# Patient Record
Sex: MALE | Race: WHITE | NOT HISPANIC OR LATINO | Employment: OTHER | URBAN - METROPOLITAN AREA
[De-identification: names, ages, dates, MRNs, and addresses within clinical notes are randomized per-mention and may not be internally consistent; named-entity substitution may affect disease eponyms.]

---

## 2020-05-12 PROBLEM — G47.33 MODERATE OBSTRUCTIVE SLEEP APNEA: Status: ACTIVE | Noted: 2020-05-12

## 2020-05-12 PROBLEM — R91.1 PULMONARY NODULE: Status: ACTIVE | Noted: 2020-05-12

## 2022-01-31 ENCOUNTER — OFFICE VISIT (OUTPATIENT)
Dept: FAMILY MEDICINE | Facility: CLINIC | Age: 67
End: 2022-01-31
Payer: COMMERCIAL

## 2022-01-31 VITALS
SYSTOLIC BLOOD PRESSURE: 138 MMHG | BODY MASS INDEX: 36.8 KG/M2 | WEIGHT: 242.81 LBS | OXYGEN SATURATION: 98 % | HEIGHT: 68 IN | TEMPERATURE: 97 F | HEART RATE: 76 BPM | DIASTOLIC BLOOD PRESSURE: 88 MMHG

## 2022-01-31 DIAGNOSIS — Z13.220 ENCOUNTER FOR LIPID SCREENING FOR CARDIOVASCULAR DISEASE: ICD-10-CM

## 2022-01-31 DIAGNOSIS — Z13.6 ENCOUNTER FOR LIPID SCREENING FOR CARDIOVASCULAR DISEASE: ICD-10-CM

## 2022-01-31 DIAGNOSIS — Z12.5 ENCOUNTER FOR PROSTATE CANCER SCREENING: ICD-10-CM

## 2022-01-31 DIAGNOSIS — E66.2 CLASS 2 OBESITY WITH ALVEOLAR HYPOVENTILATION, SERIOUS COMORBIDITY, AND BODY MASS INDEX (BMI) OF 36.0 TO 36.9 IN ADULT: ICD-10-CM

## 2022-01-31 DIAGNOSIS — Z79.899 ENCOUNTER FOR LONG-TERM (CURRENT) USE OF MEDICATIONS: ICD-10-CM

## 2022-01-31 DIAGNOSIS — Z00.00 ENCOUNTER FOR MEDICAL EXAMINATION TO ESTABLISH CARE: Primary | ICD-10-CM

## 2022-01-31 DIAGNOSIS — I10 HYPERTENSION, ESSENTIAL: ICD-10-CM

## 2022-01-31 DIAGNOSIS — Z13.6 SCREENING FOR CARDIOVASCULAR CONDITION: ICD-10-CM

## 2022-01-31 PROBLEM — H91.93 HEARING LOSS, BILATERAL: Status: ACTIVE | Noted: 2017-01-19

## 2022-01-31 PROBLEM — E78.00 HYPERCHOLESTEROLEMIA: Status: ACTIVE | Noted: 2022-01-31

## 2022-01-31 PROBLEM — H61.23 BILATERAL IMPACTED CERUMEN: Status: ACTIVE | Noted: 2017-01-19

## 2022-01-31 PROBLEM — E66.812 CLASS 2 OBESITY WITH ALVEOLAR HYPOVENTILATION, SERIOUS COMORBIDITY, AND BODY MASS INDEX (BMI) OF 36.0 TO 36.9 IN ADULT: Status: ACTIVE | Noted: 2022-01-31

## 2022-01-31 PROBLEM — K11.5 SALIVARY GLAND STONE: Status: ACTIVE | Noted: 2017-01-19

## 2022-01-31 PROCEDURE — 93005 ELECTROCARDIOGRAM TRACING: CPT | Mod: S$GLB,,, | Performed by: FAMILY MEDICINE

## 2022-01-31 PROCEDURE — 3288F PR FALLS RISK ASSESSMENT DOCUMENTED: ICD-10-PCS | Mod: CPTII,S$GLB,, | Performed by: FAMILY MEDICINE

## 2022-01-31 PROCEDURE — 1101F PT FALLS ASSESS-DOCD LE1/YR: CPT | Mod: CPTII,S$GLB,, | Performed by: FAMILY MEDICINE

## 2022-01-31 PROCEDURE — 3008F BODY MASS INDEX DOCD: CPT | Mod: CPTII,S$GLB,, | Performed by: FAMILY MEDICINE

## 2022-01-31 PROCEDURE — 1126F AMNT PAIN NOTED NONE PRSNT: CPT | Mod: CPTII,S$GLB,, | Performed by: FAMILY MEDICINE

## 2022-01-31 PROCEDURE — 99387 PR PREVENTIVE VISIT,NEW,65 & OVER: ICD-10-PCS | Mod: S$GLB,,, | Performed by: FAMILY MEDICINE

## 2022-01-31 PROCEDURE — 1101F PR PT FALLS ASSESS DOC 0-1 FALLS W/OUT INJ PAST YR: ICD-10-PCS | Mod: CPTII,S$GLB,, | Performed by: FAMILY MEDICINE

## 2022-01-31 PROCEDURE — 3079F DIAST BP 80-89 MM HG: CPT | Mod: CPTII,S$GLB,, | Performed by: FAMILY MEDICINE

## 2022-01-31 PROCEDURE — 99387 INIT PM E/M NEW PAT 65+ YRS: CPT | Mod: S$GLB,,, | Performed by: FAMILY MEDICINE

## 2022-01-31 PROCEDURE — 93010 EKG 12-LEAD: ICD-10-PCS | Mod: S$GLB,,, | Performed by: INTERNAL MEDICINE

## 2022-01-31 PROCEDURE — 93010 ELECTROCARDIOGRAM REPORT: CPT | Mod: S$GLB,,, | Performed by: INTERNAL MEDICINE

## 2022-01-31 PROCEDURE — 1159F PR MEDICATION LIST DOCUMENTED IN MEDICAL RECORD: ICD-10-PCS | Mod: CPTII,S$GLB,, | Performed by: FAMILY MEDICINE

## 2022-01-31 PROCEDURE — 1159F MED LIST DOCD IN RCRD: CPT | Mod: CPTII,S$GLB,, | Performed by: FAMILY MEDICINE

## 2022-01-31 PROCEDURE — 3075F PR MOST RECENT SYSTOLIC BLOOD PRESS GE 130-139MM HG: ICD-10-PCS | Mod: CPTII,S$GLB,, | Performed by: FAMILY MEDICINE

## 2022-01-31 PROCEDURE — 3008F PR BODY MASS INDEX (BMI) DOCUMENTED: ICD-10-PCS | Mod: CPTII,S$GLB,, | Performed by: FAMILY MEDICINE

## 2022-01-31 PROCEDURE — 1126F PR PAIN SEVERITY QUANTIFIED, NO PAIN PRESENT: ICD-10-PCS | Mod: CPTII,S$GLB,, | Performed by: FAMILY MEDICINE

## 2022-01-31 PROCEDURE — 3288F FALL RISK ASSESSMENT DOCD: CPT | Mod: CPTII,S$GLB,, | Performed by: FAMILY MEDICINE

## 2022-01-31 PROCEDURE — 3075F SYST BP GE 130 - 139MM HG: CPT | Mod: CPTII,S$GLB,, | Performed by: FAMILY MEDICINE

## 2022-01-31 PROCEDURE — 93005 EKG 12-LEAD: ICD-10-PCS | Mod: S$GLB,,, | Performed by: FAMILY MEDICINE

## 2022-01-31 PROCEDURE — 3079F PR MOST RECENT DIASTOLIC BLOOD PRESSURE 80-89 MM HG: ICD-10-PCS | Mod: CPTII,S$GLB,, | Performed by: FAMILY MEDICINE

## 2022-01-31 PROCEDURE — 99999 PR PBB SHADOW E&M-NEW PATIENT-LVL IV: CPT | Mod: PBBFAC,,, | Performed by: FAMILY MEDICINE

## 2022-01-31 PROCEDURE — 99999 PR PBB SHADOW E&M-NEW PATIENT-LVL IV: ICD-10-PCS | Mod: PBBFAC,,, | Performed by: FAMILY MEDICINE

## 2022-01-31 PROCEDURE — 1160F PR REVIEW ALL MEDS BY PRESCRIBER/CLIN PHARMACIST DOCUMENTED: ICD-10-PCS | Mod: CPTII,S$GLB,, | Performed by: FAMILY MEDICINE

## 2022-01-31 PROCEDURE — 1160F RVW MEDS BY RX/DR IN RCRD: CPT | Mod: CPTII,S$GLB,, | Performed by: FAMILY MEDICINE

## 2022-01-31 RX ORDER — ESOMEPRAZOLE MAGNESIUM 40 MG/1
CAPSULE, DELAYED RELEASE ORAL
COMMUNITY
Start: 2021-07-26 | End: 2022-01-31

## 2022-01-31 RX ORDER — AMLODIPINE BESYLATE 5 MG/1
TABLET ORAL
COMMUNITY
End: 2022-12-12 | Stop reason: SDUPTHER

## 2022-01-31 NOTE — PATIENT INSTRUCTIONS
"Follow up in about 1 year (around 1/31/2023), or if symptoms worsen or fail to improve, for Annual Wellness Exam.     Patient Education       Prostate Cancer Screening (PSA Tests)   The Basics   Written by the doctors and editors at [facility]   What is prostate cancer screening? -- Prostate cancer screening is a way in which doctors check the prostate gland for signs of cancer. The prostate gland is part of the male anatomy. It sits below the bladder and in front of the rectum. It forms a ring around the urethra, the tube that carries urine out of the body (figure 1).  The main test used to screen for prostate cancer is a blood test called a "PSA test." Some people also have an exam called a rectal exam (figure 2).   Who should be screened for prostate cancer? -- Prostate cancer screening is done in people who have no symptoms of the disease. It is not clear whether getting screened for prostate cancer can extend life or prevent symptoms or problems. For this reason, doctors do not know who - if anyone - should be screened for prostate cancer.  Most experts recommend working with your doctor to decide whether screening is right for you. In most cases, this discussion should start around the age of 50. If you have any risk factors for prostate cancer, you might want to begin screening at age 40 to 45. Your risk is higher if you are black, have a father or brother with prostate cancer, or have certain genetic mutations such as "BRCA1" or "BRCA2."  Most doctors recommend against screening if you are age 70 or older, or have serious health problems.  Why do doctors offer screening? -- Doctors offer screening in the hopes of catching prostate cancer early - before it has a chance to grow, spread, or cause symptoms. With many cancers, catching the disease early is an important part of effective treatment. But prostate cancer is not like many other cancers. It usually grows slowly and does not usually lead to death. The " "problem is that a small number of prostate cancers are serious and can lead to death. Doctors do not have an ideal way to tell which prostate cancers are deadly and which ones would never cause any problems.  Certain tests can suggest which prostate cancers might be more likely to cause problems. But the tests are far from perfect. Also, different studies have come to different conclusions about the benefits of screening and whether or not it lowers the risk of dying from prostate cancer.  What are the drawbacks to getting screened? -- PSA tests have 2 main drawbacks:  · PSA tests sometimes give "false positives." This means they suggest cancer when there is actually no cancer. This can lead to unneeded worry and to further tests. One of these tests, a biopsy, can be briefly painful.  · When PSA tests lead to the discovery of cancer, there is very often no way to tell whether the cancer is one that could do harm. That means you could get treated for cancer that would never have done you any harm. That's a problem because treatment for prostate cancer has risks and often causes problems of its own. For instance, prostate cancer treatment can cause you to leak urine and to have problems with sex.  How do I decide if I should be screened? -- Work with your doctor or nurse to decide if screening is right for you. This will involve thinking about how likely it is that you will get prostate cancer. If you have a high risk of prostate cancer, screening might be a good idea.  You will also need to think about how you feel about the possible benefits and harms of being screened. Ask yourself:  · Do I want to know if I have prostate cancer, even if the cancer might never do me any harm?  · Would I be treated if I learned that I had prostate cancer?  · How do I feel about the risks of being treated for prostate cancer?  · How do I feel about the risks of getting a deadly or aggressive form of prostate cancer?  · Would I be " "willing to accept a high risk of side effects from treatment in return for a small chance of living longer?  What is a PSA test? -- PSA stands for "prostate-specific antigen." PSA is a protein made by the prostate. Levels of this protein usually go up when a person has prostate cancer. The protein also goes up for reasons that do not involve cancer. For example, PSA levels rise when a man:  · Has a condition called benign prostatic hyperplasia (BPH), sometimes called an enlarged prostate  · Has a prostate infection, also called prostatitis  · Hurts his prostate, for example while riding a bike  · Ejaculates (has an orgasm)  What if my PSA level is too high? -- If your PSA level is high, try not to panic. It's possible your PSA is high for reasons unrelated to cancer. If your PSA level is only somewhat high, often the next step is to have the PSA test again. For 2 days before the second test, avoid ejaculating and bike-riding. If your doctor thinks you have a prostate infection, you might also need to take antibiotics for a while before you repeat the test.  If your PSA is still high on the second test, or if it was very high the first time, you will probably need more testing. This might include a biopsy or other test. A biopsy means that a doctor will insert a needle into your prostate to take tiny samples of tissue. Those samples will then go to the lab to be checked for cancer.  If it turns out you do have cancer, remember that prostate cancer is not usually deadly. It usually grows slowly, so you probably have time to decide what to do. There are treatments that can sometimes cure prostate cancer. You might also choose to hold off on treatment and wait to see if your cancer shows signs of progressing.  How often should I be screened for prostate cancer? -- If you do decide to be screened, experts recommend repeating screening every 2 to 4 years.  Doctors recommend stopping screening when you turn 70 or if you " develop serious health problems. In these cases, the benefits of screening are not worth the possible harms.  All topics are updated as new evidence becomes available and our peer review process is complete.  This topic retrieved from [employer] on: Sep 21, 2021.  Topic 55378 Version 13.0  Release: 29.4.2 - C29.263  © 2021 UpToDate, Inc. and/or its affiliates. All rights reserved.  figure 1: Prostate gland     This drawing shows male internal organs and a close-up of the prostate gland.  Graphic 56658 Version 5.0    figure 2: Rectal exam     During a rectal exam, the doctor or nurse puts a finger inside your rectum and feels your prostate gland. That way he or she can see how big it is and whether it has bumps or dents or anything unusual.  Graphic 74249 Version 5.0    Consumer Information Use and Disclaimer   This information is not specific medical advice and does not replace information you receive from your health care provider. This is only a brief summary of general information. It does NOT include all information about conditions, illnesses, injuries, tests, procedures, treatments, therapies, discharge instructions or life-style choices that may apply to you. You must talk with your health care provider for complete information about your health and treatment options. This information should not be used to decide whether or not to accept your health care provider's advice, instructions or recommendations. Only your health care provider has the knowledge and training to provide advice that is right for you. The use of this information is governed by the [employer] End User License Agreement, available at https://www.Marco Pol[employer] Project.Innobits/en/solutions/[employer]/about/margoth.The use of [employer] content is governed by the [employer] Terms of Use. ©2021 UpToDate, Inc. All rights reserved.  Copyright   © 2021 UpToDate, Inc. and/or its affiliates. All rights reserved.      Dear patient,   As a result of recent federal  legislation (The Federal Cures Act), you may receive lab or pathology results from your visit in your [facility] account before your physician is able to contact you. Your physician or their representative will relay the results to you with their recommendations at their soonest availability.     If no improvement in symptoms or symptoms worsen, please be advised to call MD, follow-up at clinic and/or go to ER if becomes severe.    Jared Vo M.D.        We Offer TELEHEALTH & Same Day Appointments!   Book your Telehealth appointment with me through my nurse or   Clinic appointments on [facility]!    [address] [place] [address]    Office: [phone]   FAX: [phone]    Check out my Facebook Page and Follow Me at: [email or web address]    Check out my website at [employer] by clicking on: [email or web address]    To Schedule appointments online, go to [facility]: [email or web address]

## 2022-01-31 NOTE — PROGRESS NOTES
This note is specifically for wellness visit performed today.   WELLNESS EXAM    Patient ID: Skinny Del Cid is a 66 y.o. male.  has a past medical history of Hypertension, Lung nodule, and MARK (obstructive sleep apnea).   Chief Complaint:  Encounter for wellness exam    Well Adult Physical: Patient here for a comprehensive physical exam.The patient reports chronic problems.  New patient.  [facility] at internal medicine clinic  [facility] Mckinnon [phone]  Dr. Florencio Lanier: Cardiology - followed for hypertension.  Patient reports negative stress test in the past.  Samuel Lainez MD: Pulmonary Disease- follow-up for pulmonary nodule and sleep apnea.  Latesha Rayo MD : ENT & Allergy   Gen Surg: Quincy Motley: Colonoscopy 2/4/2022      Hypertension:CHRONIC. STABLE. BP Reviewed.  Compliant with BP medications. No SE reported.   (-) CP, SOB, palpitations, dizziness, lightheadedness, HA, arm numbness, tingling or weakness, syncope.  No results found for: CREATININE  Counseled on importance of hypertension disease course, I recommend ongoing Education for DASH-diet and exercise.  Counseled on medication regimen importance of treating high blood pressure.  Please be advised of risk of untreated blood pressure as discussed.  Please advised of ER precautions were given for symptoms of hypertensive urgency and emergency.    Do you take any herbs or supplements that were not prescribed by a doctor? no   Are you taking calcium supplements? no    History:  None current.  Patient has had PSA checked in the past.  Would like to proceed.  The natural history of prostate cancer and ongoing controversy regarding screening and potential treatment outcomes of prostate cancer has been discussed with the patient. The meaning of a false positive PSA and a false negative PSA has been discussed. He indicates understanding of the limitations of this  screening test and wishes  to proceed with screening PSA testing.    Date last PSA: No results found for: PSA   No results found for: TESTOSTERONE No results found for: TESTOSTERONE, TOTALTESTOST, BIOTESTO   Colon cancer screening:  Patient has colonoscopy this week.  The patient has no Health Maintenance topics of status Not Due   ==============================================  History reviewed.   Health Maintenance Due   Topic Date Due    PROSTATE-SPECIFIC ANTIGEN  Never done    Lipid Panel  Never done    TETANUS VACCINE  Never done    Colorectal Cancer Screening  Never done    Shingles Vaccine (1 of 2) Never done    Pneumococcal Vaccines (Age 65+) (1 of 1 - PPSV23) Never done    Influenza Vaccine (1) 09/01/2021       Past Medical History:  Past Medical History:   Diagnosis Date    Hypertension     Lung nodule     MARK (obstructive sleep apnea)      Past Surgical History:   Procedure Laterality Date    GALLBLADDER SURGERY      SHOULDER SURGERY       Review of patient's allergies indicates:  No Known Allergies  Current Outpatient Medications on File Prior to Visit   Medication Sig Dispense Refill    amLODIPine (NORVASC) 5 MG tablet amlodipine 5 mg tablet   TAKE 1 TABLET BY MOUTH EVERY DAY      carvediloL (COREG) 6.25 MG tablet Take 6.25 mg by mouth 2 (two) times daily with meals.      losartan-hydrochlorothiazide 100-25 mg (HYZAAR) 100-25 mg per tablet Take 1 tablet by mouth once daily.      [DISCONTINUED] esomeprazole (NEXIUM) 40 MG capsule esomeprazole magnesium 40 mg capsule,delayed release   TAKE 1 CAPSULE BY MOUTH EVERY DAY       No current facility-administered medications on file prior to visit.     Social History     Socioeconomic History    Marital status:    Tobacco Use    Smoking status: Never Smoker    Smokeless tobacco: Never Used     History reviewed. No pertinent family history.    Review of Systems   Constitutional: Negative for chills, fatigue, fever and unexpected weight  change.   HENT: Negative for ear pain and sore throat.    Eyes: Negative for redness and visual disturbance.   Respiratory: Negative for cough and shortness of breath.    Cardiovascular: Negative for chest pain and palpitations.   Gastrointestinal: Negative for nausea and vomiting.   Endocrine: Negative for cold intolerance and heat intolerance.   Genitourinary: Negative for difficulty urinating and hematuria.   Musculoskeletal: Negative for arthralgias and myalgias.   Skin: Negative for rash and wound.   Allergic/Immunologic: Negative for environmental allergies and food allergies.   Neurological: Negative for weakness and headaches.   Hematological: Negative for adenopathy. Does not bruise/bleed easily.   Psychiatric/Behavioral: Negative for sleep disturbance. The patient is not nervous/anxious.         Objective:     Vitals:    01/31/22 1112   BP: 138/88   Pulse: 76   Temp: 97.4 °F (36.3 °C)    Body mass index is 36.92 kg/m².  Physical Exam  Vitals and nursing note reviewed.   Constitutional:       General: He is not in acute distress.     Appearance: He is well-developed. He is obese.   HENT:      Head: Normocephalic and atraumatic.      Right Ear: External ear normal.      Left Ear: External ear normal.      Nose: Nose normal. No rhinorrhea.   Eyes:      Extraocular Movements: Extraocular movements intact.      Pupils: Pupils are equal, round, and reactive to light.   Cardiovascular:      Rate and Rhythm: Normal rate.      Pulses: Normal pulses.   Pulmonary:      Effort: Pulmonary effort is normal. No respiratory distress.      Breath sounds: Normal breath sounds.   Musculoskeletal:         General: Normal range of motion.      Cervical back: Normal range of motion and neck supple.   Skin:     General: Skin is warm and dry.      Capillary Refill: Capillary refill takes less than 2 seconds.   Neurological:      General: No focal deficit present.      Mental Status: He is alert and oriented to person, place, and  time.   Psychiatric:         Mood and Affect: Mood normal.         Behavior: Behavior normal.          No results found for any previous visit.      EKG is normal sinus rhythm.No results found for this or any previous visit.    Assessment / Plan:    1.  Routine health exam-patient here for annual wellness exam.  Labs ordered.  Health maintenance was reviewed and ordered.  Anticipatory guidance: Don't smoke.  Healthy diet and regular exercise recommended. Vaccine recommendations discussed.  See orders.  Reviewed Anticipatory guidance, risk factor reduction interventions or counseling as needed, Complete history , physical was completed today.  Complete and thorough medication reconciliation was performed.  Discussed risks and benefits of medications.  Advised patient on orders and health maintenance.  We discussed old records and old labs if available.  Will request any records not available through epic.  Continue current medications listed on your summary sheet.  All questions were answered. Patient had no further concerns. Advised of diagnoses and plan. Follow up as planned or return sooner if symptoms persist or worsen.   Counseled on importance of hypertension disease course, I recommend ongoing Education for DASH-diet and exercise.  Counseled on medication regimen importance of treating high blood pressure.  Please be advised of risk of untreated blood pressure as discussed.  Please advised of ER precautions were given for symptoms of hypertensive urgency and emergency.    Orders Placed This Encounter   Procedures    Hemoglobin     Standing Status:   Standing     Number of Occurrences:   99     Standing Expiration Date:   2/26/2041    Comprehensive Metabolic Panel     Standing Status:   Standing     Number of Occurrences:   99     Standing Expiration Date:   2/26/2041    Lipid Panel     Standing Status:   Standing     Number of Occurrences:   99     Standing Expiration Date:   2/26/2041    Hemoglobin A1C      Standing Status:   Standing     Number of Occurrences:   99     Standing Expiration Date:   2/26/2041    PSA, Screening     Standing Status:   Standing     Number of Occurrences:   99     Standing Expiration Date:   1/26/2042    IN OFFICE EKG 12-LEAD (to Muse)     Order Specific Question:   Diagnosis     Answer:   Screening for cardiovascular condition [[id number]]           Future Appointments     Date Provider Specialty Appt Notes    2/7/2022  Lab .    1/31/2023 Jared Vo MD Family Medicine annual          Jared Vo MD

## 2022-02-04 LAB — CRC RECOMMENDATION EXT: NORMAL

## 2022-02-07 ENCOUNTER — LAB VISIT (OUTPATIENT)
Dept: LAB | Facility: HOSPITAL | Age: 67
End: 2022-02-07
Attending: FAMILY MEDICINE
Payer: COMMERCIAL

## 2022-02-07 DIAGNOSIS — Z79.899 ENCOUNTER FOR LONG-TERM (CURRENT) USE OF MEDICATIONS: ICD-10-CM

## 2022-02-07 DIAGNOSIS — Z00.00 ENCOUNTER FOR MEDICAL EXAMINATION TO ESTABLISH CARE: ICD-10-CM

## 2022-02-07 DIAGNOSIS — Z13.6 ENCOUNTER FOR LIPID SCREENING FOR CARDIOVASCULAR DISEASE: ICD-10-CM

## 2022-02-07 DIAGNOSIS — Z12.5 ENCOUNTER FOR PROSTATE CANCER SCREENING: ICD-10-CM

## 2022-02-07 DIAGNOSIS — Z13.220 ENCOUNTER FOR LIPID SCREENING FOR CARDIOVASCULAR DISEASE: ICD-10-CM

## 2022-02-07 LAB
ALBUMIN SERPL BCP-MCNC: 3.9 G/DL (ref 3.5–5.2)
ALP SERPL-CCNC: 49 U/L (ref 55–135)
ALT SERPL W/O P-5'-P-CCNC: 28 U/L (ref 10–44)
ANION GAP SERPL CALC-SCNC: 8 MMOL/L (ref 8–16)
AST SERPL-CCNC: 21 U/L (ref 10–40)
BILIRUB SERPL-MCNC: 0.4 MG/DL (ref 0.1–1)
BUN SERPL-MCNC: 15 MG/DL (ref 8–23)
CALCIUM SERPL-MCNC: 10.1 MG/DL (ref 8.7–10.5)
CHLORIDE SERPL-SCNC: 105 MMOL/L (ref 95–110)
CHOLEST SERPL-MCNC: 172 MG/DL (ref 120–199)
CHOLEST/HDLC SERPL: 4.6 {RATIO} (ref 2–5)
CO2 SERPL-SCNC: 28 MMOL/L (ref 23–29)
COMPLEXED PSA SERPL-MCNC: 0.67 NG/ML (ref 0–4)
CREAT SERPL-MCNC: 0.8 MG/DL (ref 0.5–1.4)
EST. GFR  (AFRICAN AMERICAN): >60 ML/MIN/1.73 M^2
EST. GFR  (NON AFRICAN AMERICAN): >60 ML/MIN/1.73 M^2
ESTIMATED AVG GLUCOSE: 105 MG/DL (ref 68–131)
GLUCOSE SERPL-MCNC: 100 MG/DL (ref 70–110)
HBA1C MFR BLD: 5.3 % (ref 4–5.6)
HDLC SERPL-MCNC: 37 MG/DL (ref 40–75)
HDLC SERPL: 21.5 % (ref 20–50)
HGB BLD-MCNC: 15.1 G/DL (ref 14–18)
LDLC SERPL CALC-MCNC: 109.2 MG/DL (ref 63–159)
NONHDLC SERPL-MCNC: 135 MG/DL
POTASSIUM SERPL-SCNC: 4.2 MMOL/L (ref 3.5–5.1)
PROT SERPL-MCNC: 7.6 G/DL (ref 6–8.4)
SODIUM SERPL-SCNC: 141 MMOL/L (ref 136–145)
TRIGL SERPL-MCNC: 129 MG/DL (ref 30–150)

## 2022-02-07 PROCEDURE — 80061 LIPID PANEL: CPT | Performed by: FAMILY MEDICINE

## 2022-02-07 PROCEDURE — 36415 COLL VENOUS BLD VENIPUNCTURE: CPT | Mod: PO | Performed by: FAMILY MEDICINE

## 2022-02-07 PROCEDURE — 83036 HEMOGLOBIN GLYCOSYLATED A1C: CPT | Performed by: FAMILY MEDICINE

## 2022-02-07 PROCEDURE — 85018 HEMOGLOBIN: CPT | Mod: PO | Performed by: FAMILY MEDICINE

## 2022-02-07 PROCEDURE — 80053 COMPREHEN METABOLIC PANEL: CPT | Performed by: FAMILY MEDICINE

## 2022-02-07 PROCEDURE — 84153 ASSAY OF PSA TOTAL: CPT | Performed by: FAMILY MEDICINE

## 2022-02-10 ENCOUNTER — PATIENT MESSAGE (OUTPATIENT)
Dept: FAMILY MEDICINE | Facility: CLINIC | Age: 67
End: 2022-02-10

## 2022-02-10 NOTE — PROGRESS NOTES
Make follow-up lab appointment per recommendation below.  Check to see if patient has seen the results through my chart.  If not then,  #CALL THE PATIENT# to discuss results/see if they have questions and document verification of contact. Make F/U appt if needed. [phone]    #My interpretation that was sent to them through [employer]:  Skinny, I have reviewed your recent blood work.   PSA screening for prostate cancer is within normal limits.  Repeat annually.  Your  hemoglobin is normal.  No anemia  Your metabolic panel which shows your glucose, kidney function, electrolytes, and liver function is normal.   Your cholesterol is normal.    Your hemoglobin A1c is normal, no diabetes.  This test is gold standard screening test for diabetes.  It is a measures 3 months of your average blood sugar.    Repeat annual wellness labs in one year.  =========================  Also please address any outstanding health maintenance that may be due: Hepatitis C Screening Never done  TETANUS VACCINE Never done  Colorectal Cancer Screening Never done  Shingles Vaccine(1 of 2) Never done  Pneumococcal Vaccines (Age 65+)(1 of 1 - PPSV23) Never done  Influenza Vaccine(1) due on 09/01/2021

## 2022-03-18 ENCOUNTER — PATIENT MESSAGE (OUTPATIENT)
Dept: ADMINISTRATIVE | Facility: HOSPITAL | Age: 67
End: 2022-03-18

## 2022-04-06 RX ORDER — AMLODIPINE BESYLATE 5 MG/1
TABLET ORAL
Qty: 90 TABLET | Refills: 0 | OUTPATIENT
Start: 2022-04-06

## 2022-04-06 RX ORDER — LOSARTAN POTASSIUM AND HYDROCHLOROTHIAZIDE 25; 100 MG/1; MG/1
TABLET ORAL
Qty: 90 TABLET | Refills: 0 | OUTPATIENT
Start: 2022-04-06

## 2022-04-06 NOTE — TELEPHONE ENCOUNTER
No new care gaps identified.  Powered by [employer]. Reference number: [id number].   4/06/2022 9:24:04 AM CDT

## 2022-04-06 NOTE — TELEPHONE ENCOUNTER
[facility] Note  Quick DC. Inappropriate Request   Refill request requires further review by MD: NO   Medication Therapy Plan: Pharmacy is requesting new script(s) for the following medications without required information, (sig/ frequency/qty/etc)     [facility] action(s):  Quick Discontinue      Encounter Details:    Pharmacies have been requesting medications for patients without required information, (sig, frequency, qty, etc.). In addition, requests are sent for medication(s) pt. are currently not taking, and medications patients have never taken.    We have spoken to the pharmacies about these request types and advised their teams previously that we are unable to assess these New Script requests and require all details for these requests. This is a known issue and has been reported.       Automatic Epic Generated Protocol Data Below:   Requested Prescriptions     Pending Prescriptions Disp Refills    losartan-hydrochlorothiazide 100-25 mg (HYZAAR) 100-25 mg per tablet [Pharmacy Med Name: LOSARTAN/HCTZ 100-25MG TAB] 90 tablet 0            Appointments      Date Provider   Last Visit   1/31/2022 Jared Vo MD   Next Visit   Visit date not found Jared Vo MD        Note composed:9:27 AM 04/06/2022

## 2022-04-06 NOTE — TELEPHONE ENCOUNTER
[facility] Note  Quick DC. Inappropriate Request   Refill request requires further review by MD: NO   Medication Therapy Plan: Pharmacy is requesting new script(s) for the following medications without required information, (sig/ frequency/qty/etc)     [facility] action(s):  Quick Discontinue      Encounter Details:    Pharmacies have been requesting medications for patients without required information, (sig, frequency, qty, etc.). In addition, requests are sent for medication(s) pt. are currently not taking, and medications patients have never taken.    We have spoken to the pharmacies about these request types and advised their teams previously that we are unable to assess these New Script requests and require all details for these requests. This is a known issue and has been reported.       Automatic Epic Generated Protocol Data Below:   Requested Prescriptions     Pending Prescriptions Disp Refills    amLODIPine (NORVASC) 5 MG tablet [Pharmacy Med Name: AMLODIPINE  5MG TAB] 90 tablet 0            Appointments      Date Provider   Last Visit   1/31/2022 Jared Vo MD   Next Visit   4/6/2022 Jared Vo MD        Note composed:9:27 AM 04/06/2022

## 2022-04-06 NOTE — TELEPHONE ENCOUNTER
No new care gaps identified.  Powered by [employer] by [employer]. Reference number: [id number].   4/06/2022 9:24:18 AM CDT

## 2022-05-31 ENCOUNTER — PATIENT MESSAGE (OUTPATIENT)
Dept: FAMILY MEDICINE | Facility: CLINIC | Age: 67
End: 2022-05-31
Payer: MEDICARE

## 2022-08-25 ENCOUNTER — PATIENT MESSAGE (OUTPATIENT)
Dept: FAMILY MEDICINE | Facility: CLINIC | Age: 67
End: 2022-08-25
Payer: MEDICARE

## 2022-08-29 ENCOUNTER — TELEPHONE (OUTPATIENT)
Dept: FAMILY MEDICINE | Facility: CLINIC | Age: 67
End: 2022-08-29

## 2022-08-29 ENCOUNTER — PATIENT MESSAGE (OUTPATIENT)
Dept: FAMILY MEDICINE | Facility: CLINIC | Age: 67
End: 2022-08-29

## 2022-08-29 ENCOUNTER — OFFICE VISIT (OUTPATIENT)
Dept: FAMILY MEDICINE | Facility: CLINIC | Age: 67
End: 2022-08-29
Payer: MEDICARE

## 2022-08-29 VITALS
HEIGHT: 68 IN | WEIGHT: 242 LBS | BODY MASS INDEX: 36.68 KG/M2 | OXYGEN SATURATION: 96 % | DIASTOLIC BLOOD PRESSURE: 82 MMHG | HEART RATE: 88 BPM | SYSTOLIC BLOOD PRESSURE: 130 MMHG | TEMPERATURE: 98 F

## 2022-08-29 DIAGNOSIS — L98.9 SKIN LESION: ICD-10-CM

## 2022-08-29 DIAGNOSIS — Z79.899 ENCOUNTER FOR LONG-TERM (CURRENT) USE OF MEDICATIONS: ICD-10-CM

## 2022-08-29 DIAGNOSIS — R91.1 PULMONARY NODULE: ICD-10-CM

## 2022-08-29 DIAGNOSIS — G47.33 OSA ON CPAP: Primary | ICD-10-CM

## 2022-08-29 DIAGNOSIS — I10 HYPERTENSION, ESSENTIAL: ICD-10-CM

## 2022-08-29 PROCEDURE — 99999 PR PBB SHADOW E&M-EST. PATIENT-LVL V: ICD-10-PCS | Mod: PBBFAC,,, | Performed by: FAMILY MEDICINE

## 2022-08-29 PROCEDURE — 1160F RVW MEDS BY RX/DR IN RCRD: CPT | Mod: CPTII,S$GLB,, | Performed by: FAMILY MEDICINE

## 2022-08-29 PROCEDURE — 1101F PR PT FALLS ASSESS DOC 0-1 FALLS W/OUT INJ PAST YR: ICD-10-PCS | Mod: CPTII,S$GLB,, | Performed by: FAMILY MEDICINE

## 2022-08-29 PROCEDURE — 3288F PR FALLS RISK ASSESSMENT DOCUMENTED: ICD-10-PCS | Mod: CPTII,S$GLB,, | Performed by: FAMILY MEDICINE

## 2022-08-29 PROCEDURE — 3044F PR MOST RECENT HEMOGLOBIN A1C LEVEL <7.0%: ICD-10-PCS | Mod: CPTII,S$GLB,, | Performed by: FAMILY MEDICINE

## 2022-08-29 PROCEDURE — 3008F BODY MASS INDEX DOCD: CPT | Mod: CPTII,S$GLB,, | Performed by: FAMILY MEDICINE

## 2022-08-29 PROCEDURE — 3075F PR MOST RECENT SYSTOLIC BLOOD PRESS GE 130-139MM HG: ICD-10-PCS | Mod: CPTII,S$GLB,, | Performed by: FAMILY MEDICINE

## 2022-08-29 PROCEDURE — 99214 OFFICE O/P EST MOD 30 MIN: CPT | Mod: S$GLB,,, | Performed by: FAMILY MEDICINE

## 2022-08-29 PROCEDURE — 3075F SYST BP GE 130 - 139MM HG: CPT | Mod: CPTII,S$GLB,, | Performed by: FAMILY MEDICINE

## 2022-08-29 PROCEDURE — 99499 UNLISTED E&M SERVICE: CPT | Mod: S$GLB,,, | Performed by: FAMILY MEDICINE

## 2022-08-29 PROCEDURE — 99499 RISK ADDL DX/OHS AUDIT: ICD-10-PCS | Mod: S$GLB,,, | Performed by: FAMILY MEDICINE

## 2022-08-29 PROCEDURE — 3044F HG A1C LEVEL LT 7.0%: CPT | Mod: CPTII,S$GLB,, | Performed by: FAMILY MEDICINE

## 2022-08-29 PROCEDURE — 3079F PR MOST RECENT DIASTOLIC BLOOD PRESSURE 80-89 MM HG: ICD-10-PCS | Mod: CPTII,S$GLB,, | Performed by: FAMILY MEDICINE

## 2022-08-29 PROCEDURE — 1160F PR REVIEW ALL MEDS BY PRESCRIBER/CLIN PHARMACIST DOCUMENTED: ICD-10-PCS | Mod: CPTII,S$GLB,, | Performed by: FAMILY MEDICINE

## 2022-08-29 PROCEDURE — 1159F MED LIST DOCD IN RCRD: CPT | Mod: CPTII,S$GLB,, | Performed by: FAMILY MEDICINE

## 2022-08-29 PROCEDURE — 99999 PR PBB SHADOW E&M-EST. PATIENT-LVL V: CPT | Mod: PBBFAC,,, | Performed by: FAMILY MEDICINE

## 2022-08-29 PROCEDURE — 1126F AMNT PAIN NOTED NONE PRSNT: CPT | Mod: CPTII,S$GLB,, | Performed by: FAMILY MEDICINE

## 2022-08-29 PROCEDURE — 99214 PR OFFICE/OUTPT VISIT, EST, LEVL IV, 30-39 MIN: ICD-10-PCS | Mod: S$GLB,,, | Performed by: FAMILY MEDICINE

## 2022-08-29 PROCEDURE — 1159F PR MEDICATION LIST DOCUMENTED IN MEDICAL RECORD: ICD-10-PCS | Mod: CPTII,S$GLB,, | Performed by: FAMILY MEDICINE

## 2022-08-29 PROCEDURE — 3288F FALL RISK ASSESSMENT DOCD: CPT | Mod: CPTII,S$GLB,, | Performed by: FAMILY MEDICINE

## 2022-08-29 PROCEDURE — 3079F DIAST BP 80-89 MM HG: CPT | Mod: CPTII,S$GLB,, | Performed by: FAMILY MEDICINE

## 2022-08-29 PROCEDURE — 1101F PT FALLS ASSESS-DOCD LE1/YR: CPT | Mod: CPTII,S$GLB,, | Performed by: FAMILY MEDICINE

## 2022-08-29 PROCEDURE — 3008F PR BODY MASS INDEX (BMI) DOCUMENTED: ICD-10-PCS | Mod: CPTII,S$GLB,, | Performed by: FAMILY MEDICINE

## 2022-08-29 PROCEDURE — 1126F PR PAIN SEVERITY QUANTIFIED, NO PAIN PRESENT: ICD-10-PCS | Mod: CPTII,S$GLB,, | Performed by: FAMILY MEDICINE

## 2022-08-29 NOTE — ASSESSMENT & PLAN NOTE
Patient had CT of the chest without contrast ordered by Pulmonary in 2020.  Patient did not have this completed due to COVID reasons.  He would like to update this scan and follow-up with Pulmonary if needed.  Clinically necessary to monitor greater than 8 millimeter pulmonary nodule.

## 2022-08-29 NOTE — ASSESSMENT & PLAN NOTE
Tree sleep apnea with CPAP.  Continue current medications.  Follow-up with cardiology as scheduled.Counseled on importance of hypertension disease course, I recommend ongoing Education for DASH-diet and exercise.  Counseled on medication regimen importance of treating high blood pressure.  Please be advised of risk of untreated blood pressure as discussed.  Please advised of ER precautions were given for symptoms of hypertensive urgency and emergency.

## 2022-08-29 NOTE — PROGRESS NOTES
PLAN:      Problem List Items Addressed This Visit       Pulmonary nodule (Chronic)     Patient had CT of the chest without contrast ordered by Pulmonary in 2020.  Patient did not have this completed due to COVID reasons.  He would like to update this scan and follow-up with Pulmonary if needed.  Clinically necessary to monitor greater than 8 millimeter pulmonary nodule.         Relevant Orders    CT Chest Without Contrast    MARK on CPAP - Primary (Chronic)     Patient is benefiting from machine however the machine is old and he cannot find parts for it any longer.  Patient uses CPAP at 6cm.  Order will be sent to [employer] [facility].  Ordering home sleep study to update study.  I do not see one on file however he has seen Pulmonary for this in the past.         Relevant Orders    CPAP/BIPAP SUPPLIES    Home Sleep Studies    CPAP FOR HOME USE    Hypertension, essential (Chronic)     Tree sleep apnea with CPAP.  Continue current medications.  Follow-up with cardiology as scheduled.Counseled on importance of hypertension disease course, I recommend ongoing Education for DASH-diet and exercise.  Counseled on medication regimen importance of treating high blood pressure.  Please be advised of risk of untreated blood pressure as discussed.  Please advised of ER precautions were given for symptoms of hypertensive urgency and emergency.           Encounter for long-term (current) use of medications (Chronic)     Complete history and physical was completed today.  Complete and thorough medication reconciliation was performed.  Discussed risks and benefits of medications.  Advised patient on orders and health maintenance.  We discussed old records and old labs if available.  Will request any records not available through epic.  Continue current medications listed on your summary sheet.           Skin lesion     Referral placed per request to external Dermatology.  Discussed skin protection daily.         Relevant Orders    Ambulatory  referral/consult to Dermatology     Future Appointments       Date Provider Specialty Appt Notes    9/9/2022  Radiology Pulmonary nodule     1/31/2023 Jared Vo MD Family Medicine annual            Medication Management for assessment above:   Medication List with Changes/Refills   Current Medications    AMLODIPINE (NORVASC) 5 MG TABLET    amlodipine 5 mg tablet   TAKE 1 TABLET BY MOUTH EVERY DAY    LOSARTAN-HYDROCHLOROTHIAZIDE 100-25 MG (HYZAAR) 100-25 MG PER TABLET    Take 1 tablet by mouth once daily.   Discontinued Medications    CARVEDILOL (COREG) 6.25 MG TABLET    Take 6.25 mg by mouth 2 (two) times daily with meals.       Jared Vo M.D.  ==========================================================================  Subjective:   Patient ID: Skinny Del Cid is a 67 y.o. male.  has a past medical history of Hypertension, Lung nodule, and MARK (obstructive sleep apnea).   Chief Complaint: cpap machine      Problem List Items Addressed This Visit       Pulmonary nodule (Chronic)    Overview     8.5 mm RML nodule [facility] 2019         Current Assessment & Plan     Patient had CT of the chest without contrast ordered by Pulmonary in 2020.  Patient did not have this completed due to COVID reasons.  He would like to update this scan and follow-up with Pulmonary if needed.  Clinically necessary to monitor greater than 8 millimeter pulmonary nodule.         MARK on CPAP - Primary (Chronic)    Overview     2011, AHI 20, desat to 82%. CPAP 6 cm effective         Current Assessment & Plan     Patient is benefiting from machine however the machine is old and he cannot find parts for it any longer.  Patient uses CPAP at 6cm.  Order will be sent to [employer].  Ordering home sleep study to update study.  I do not see one on file however he has seen Pulmonary for this in the past.         Hypertension, essential (Chronic)    Overview     CHRONIC. STABLE. BP Reviewed.  Compliant with BP medications. No SE  reported.   (-) CP, SOB, palpitations, dizziness, lightheadedness, HA, arm numbness, tingling or weakness, syncope.  Creatinine   Date Value Ref Range Status   02/07/2022 0.8 0.5 - 1.4 mg/dL Final            Current Assessment & Plan     Tree sleep apnea with CPAP.  Continue current medications.  Follow-up with cardiology as scheduled.Counseled on importance of hypertension disease course, I recommend ongoing Education for DASH-diet and exercise.  Counseled on medication regimen importance of treating high blood pressure.  Please be advised of risk of untreated blood pressure as discussed.  Please advised of ER precautions were given for symptoms of hypertensive urgency and emergency.           Encounter for long-term (current) use of medications (Chronic)    Overview     CHRONIC. Stable. Compliant with medications for managed conditions. See medication list. No SE reported.   Routine lab analysis is being monitored. Refills were addressed.  Lab Results   Component Value Date    HGB 15.1 02/07/2022       Chemistry        Component Value Date/Time     02/07/2022 0805    K 4.2 02/07/2022 0805     02/07/2022 0805    CO2 28 02/07/2022 0805    BUN 15 02/07/2022 0805    CREATININE 0.8 02/07/2022 0805     02/07/2022 0805        Component Value Date/Time    CALCIUM 10.1 02/07/2022 0805    ALKPHOS 49 (L) 02/07/2022 0805    AST 21 02/07/2022 0805    ALT 28 02/07/2022 0805    BILITOT 0.4 02/07/2022 0805    ESTGFRAFRICA >60.0 02/07/2022 0805    EGFRNONAA >60.0 02/07/2022 0805          No results found for: TSH, J4YESDM, R4EIZEL, THYROIDAB, FREET4, T3FREE           Current Assessment & Plan     Complete history and physical was completed today.  Complete and thorough medication reconciliation was performed.  Discussed risks and benefits of medications.  Advised patient on orders and health maintenance.  We discussed old records and old labs if available.  Will request any records not available through epic.   "Continue current medications listed on your summary sheet.           Skin lesion    Overview     Chronic.  Patient has multiple skin lesions on his head and neck and face.  He is requesting referral to Dermatology.         Current Assessment & Plan     Referral placed per request to external Dermatology.  Discussed skin protection daily.             Review of patient's allergies indicates:  No Known Allergies  Current Outpatient Medications   Medication Instructions    amLODIPine (NORVASC) 5 MG tablet amlodipine 5 mg tablet   TAKE 1 TABLET BY MOUTH EVERY DAY    losartan-hydrochlorothiazide 100-25 mg (HYZAAR) 100-25 mg per tablet 1 tablet, Oral, Daily      I have reviewed the PMH, social history, FamilyHx, surgical history, allergies and medications documented / confirmed by the patient at the time of this visit.  Review of Systems   Constitutional:  Negative for activity change and unexpected weight change.   HENT:  Negative for hearing loss, rhinorrhea and trouble swallowing.    Eyes:  Negative for discharge and visual disturbance.   Respiratory:  Negative for chest tightness and wheezing.    Cardiovascular:  Negative for chest pain and palpitations.   Gastrointestinal:  Negative for blood in stool, constipation, diarrhea and vomiting.   Endocrine: Negative for polydipsia and polyuria.   Genitourinary:  Negative for difficulty urinating, hematuria and urgency.   Musculoskeletal:  Negative for arthralgias, joint swelling and neck pain.   Neurological:  Negative for weakness and headaches.   Psychiatric/Behavioral:  Negative for confusion and dysphoric mood.    Objective:   /82   Pulse 88   Temp 98 °F (36.7 °C) (Other (see comments))   Ht 5' 8" (1.727 m)   Wt 109.8 kg (242 lb)   SpO2 96%   BMI 36.80 kg/m²   Physical Exam  Vitals and nursing note reviewed.   Constitutional:       General: He is not in acute distress.     Appearance: He is well-developed. He is not diaphoretic.   HENT:      Head: " Normocephalic and atraumatic.      Right Ear: External ear normal.      Left Ear: External ear normal.      Nose: Nose normal. No rhinorrhea.   Eyes:      Extraocular Movements: Extraocular movements intact.      Pupils: Pupils are equal, round, and reactive to light.   Neck:      Comments: Enlarged neck circumference  Cardiovascular:      Rate and Rhythm: Normal rate.      Pulses: Normal pulses.   Pulmonary:      Effort: Pulmonary effort is normal. No respiratory distress.      Breath sounds: Normal breath sounds.   Musculoskeletal:         General: Normal range of motion.      Cervical back: Normal range of motion and neck supple.   Skin:     General: Skin is warm and dry.      Capillary Refill: Capillary refill takes less than 2 seconds.      Findings: No rash.   Neurological:      General: No focal deficit present.      Mental Status: He is alert and oriented to person, place, and time.   Psychiatric:         Attention and Perception: He is attentive.         Mood and Affect: Mood normal. Mood is not anxious or depressed. Affect is not labile, blunt, angry or inappropriate.         Speech: He is communicative. Speech is not rapid and pressured, delayed, slurred or tangential.         Behavior: Behavior normal. Behavior is not agitated, slowed, aggressive, withdrawn, hyperactive or combative.         Thought Content: Thought content normal. Thought content is not paranoid or delusional. Thought content does not include homicidal or suicidal ideation. Thought content does not include homicidal or suicidal plan.         Cognition and Memory: Memory is not impaired.         Judgment: Judgment normal. Judgment is not impulsive or inappropriate.       Assessment:     1. MARK on CPAP    2. Encounter for long-term (current) use of medications    3. Hypertension, essential    4. Pulmonary nodule    5. Skin lesion      MDM:   Moderate complexity.  Moderate risk.  Total time: 32 minutes.  This includes total time spent on  the encounter, which includes face to face time and non-face to face time preparing to see the patient (eg, review of previous medical records, tests), Obtaining and/or reviewing separately obtained history, documenting clinical information in the electronic or other health record, independently interpreting results (not separately reported)/communicating results to the patient/family/caregiver, and/or care coordination (not separately reported).    I have Reviewed and summarized old records.  I have performed thorough medication reconciliation today and discussed risk and benefits of medications.  I have reviewed labs and discussed with patient.  All questions were answered.  I am requesting old records and will review them once they are available.  Pulmonary    I have signed for the following orders AND/OR meds.  Orders Placed This Encounter   Procedures    CPAP/BIPAP SUPPLIES     Order Specific Question:   Length of need (1-99 months):     Answer:   99     Order Specific Question:   Choose ONE mask type and its corresponding cushions and/or pillows:     Answer:    Full Face Mask, 1 per 90 days:  Full Face Cushion, (3 per 90 days)     Order Specific Question:   Choose EITHER Heated or Non-Heated Tubjing     Answer:    Heated Tubing, 1 per 6 months     Order Specific Question:   All other supplies as needed as listed below:     Answer:    Humidifier Chamber, 1 per 180 days     Order Specific Question:   All other supplies as needed as listed below:     Answer:    Headgear, 1 per 180 days     Order Specific Question:   All other supplies as needed as listed below:     Answer:    Chin Strap, 1 per 180 days     Order Specific Question:   All other supplies as needed as listed below:     Answer:    Disposable Filter, 6 per 90 days    CPAP FOR HOME USE     Order Specific Question:   Length of need (1-99 months):     Answer:   99     Order Specific Question:   Fulfillment Priority:      Answer:   Level 2:   AHI 30  to < 59 associated with systolic congestive heart failure, moderate-severe COPD, moderate-severe neuromuscular disease, HTN consistently > 140/90 despite at least 2 anti-hypertensives that patient is compliant with, atrial fibrillation     Order Specific Question:   Type ():     Answer:   CPAP     Order Specific Question:   CPAP setting (cmH20):     Answer:   1     Comments:   use prev setting     Order Specific Question:   Humidification ():     Answer:   Heated     Order Specific Question:   Choose ONE mask type and its corresponding cushions and/or pillows:     Answer:    Full Face Mask, 1 per 90 days:  Full Face Cushion, (3 per 90 days)     Order Specific Question:   Choose EITHER Heated or Non-Heated Tubjing     Answer:    Non-Heated Tubing, 1 per 90 days     Order Specific Question:   All other supplies as needed as listed below:     Answer:    Headgear, 1 per 180 days     Order Specific Question:   All other supplies as needed as listed below:     Answer:    Disposable Filter, 6 per 90 days     Order Specific Question:   All other supplies as needed as listed below:     Answer:    Exhalation Port, contact payer for quantity/frequency     Order Specific Question:   All other supplies as needed as listed below:     Answer:    Humidifier Chamber, 1 per 180 days     Order Specific Question:   All other supplies as needed as listed below:     Answer:    Non-Disposable Filter, 1 per 180 days     Order Specific Question:   All other supplies as needed as listed below:     Answer:    Chin Strap, 1 per 180 days    CT Chest Without Contrast     Standing Status:   Future     Standing Expiration Date:   8/29/2023     Order Specific Question:   May the Radiologist modify the order per protocol to meet the clinical needs of the patient?     Answer:   Yes    Ambulatory referral/consult to Dermatology     Standing Status:   Future      Standing Expiration Date:   9/29/2023     Referral Priority:   Routine     Referral Type:   Consultation     Referral Reason:   Specialty Services Required     Referred to Provider:   Melissa Byrd DO     Requested Specialty:   Dermatology     Number of Visits Requested:   1    Home Sleep Studies     Standing Status:   Future     Standing Expiration Date:   8/29/2023           Follow up in about 6 months (around 2/28/2023), or if symptoms worsen or fail to improve, for follow up.  Future Appointments       Date Provider Specialty Appt Notes    9/9/2022  Radiology Pulmonary nodule     1/31/2023 Jared Vo MD Family Medicine annual           If no improvement in symptoms or symptoms worsen, advised to call/follow-up at clinic or go to ER. Patient voiced understanding and all questions/concerns were addressed.   DISCLAIMER: This note was compiled by using a speech recognition dictation system and therefore please be aware that typographical / speech recognition errors can and do occur.  Please contact me if you see any errors specifically.    Jared Vo M.D.       Office: [phone] [address] [place] [address]  FAX: [phone]

## 2022-08-29 NOTE — ASSESSMENT & PLAN NOTE
Patient is benefiting from machine however the machine is old and he cannot find parts for it any longer.  Patient uses CPAP at 6cm.  Order will be sent to [employer].  Ordering home sleep study to update study.  I do not see one on file however he has seen Pulmonary for this in the past.

## 2022-08-29 NOTE — TELEPHONE ENCOUNTER
----- Message from Jared Vo MD sent at 8/29/2022  3:37 PM CDT -----  Fax clinical note to [facility]

## 2022-08-29 NOTE — PATIENT INSTRUCTIONS
Follow up in about 6 months (around 2/28/2023), or if symptoms worsen or fail to improve, for follow up.     Dear patient,   As a result of recent federal legislation (The Federal Cures Act), you may receive lab or pathology results from your visit in your [facility] account before your physician is able to contact you. Your physician or their representative will relay the results to you with their recommendations at their soonest availability.     If no improvement in symptoms or symptoms worsen, please be advised to call MD, follow-up at clinic and/or go to ER if becomes severe.    Jared Vo M.D.        We Offer TELEHEALTH & Same Day Appointments!   Book your Telehealth appointment with me through my nurse or   Clinic appointments on [facility]!    [address] [place] [address]    Office: [phone]   FAX: [phone]    Check out my Facebook Page and Follow Me at: [email or web address]    Check out my website at [email or web address] by clicking on: [email or web address]    To Schedule appointments online, go to [email or web address]: [email or web address]

## 2022-08-31 ENCOUNTER — TELEPHONE (OUTPATIENT)
Dept: PULMONOLOGY | Facility: HOSPITAL | Age: 67
End: 2022-08-31
Payer: MEDICARE

## 2022-09-07 PROCEDURE — 95800 PR SLEEP STUDY, UNATTENDED, RECORD HEART RATE/O2 SAT/RESP ANAL/SLEEP TIME: ICD-10-PCS | Mod: 26,,, | Performed by: INTERNAL MEDICINE

## 2022-09-07 PROCEDURE — 95800 SLP STDY UNATTENDED: CPT | Mod: 26,,, | Performed by: INTERNAL MEDICINE

## 2022-09-09 ENCOUNTER — PROCEDURE VISIT (OUTPATIENT)
Dept: SLEEP MEDICINE | Facility: CLINIC | Age: 67
End: 2022-09-09
Payer: MEDICARE

## 2022-09-09 ENCOUNTER — HOSPITAL ENCOUNTER (OUTPATIENT)
Dept: RADIOLOGY | Facility: HOSPITAL | Age: 67
Discharge: HOME | End: 2022-09-09
Attending: FAMILY MEDICINE
Payer: MEDICARE

## 2022-09-09 DIAGNOSIS — G47.33 OSA ON CPAP: ICD-10-CM

## 2022-09-09 DIAGNOSIS — R91.1 PULMONARY NODULE: ICD-10-CM

## 2022-09-09 PROCEDURE — 71250 CT CHEST WITHOUT CONTRAST: ICD-10-PCS | Mod: 26,,, | Performed by: RADIOLOGY

## 2022-09-09 PROCEDURE — 95800 SLP STDY UNATTENDED: CPT

## 2022-09-09 PROCEDURE — 71250 CT THORAX DX C-: CPT | Mod: 26,,, | Performed by: RADIOLOGY

## 2022-09-09 PROCEDURE — 71250 CT THORAX DX C-: CPT | Mod: TC,PO

## 2022-09-09 NOTE — PROCEDURES
Home Sleep Studies    Date/Time: 9/9/2022 8:00 AM  Performed by: Yuval Peña MD  Authorized by: Jared Vo MD     PHYSICIAN INTERPRETATION AND COMMENTS: Findings are consistent with severe obstructive sleep apnea (MARK). CPAP  indicated. Please refer to sleep disorders for prompt attention  CLINICAL HISTORY: 67 year old male presented with: 16 inch neck, BMI of 36.6, an [place] sleepiness score of 3, history of  hypertension, a previous diagnosis of MARK and symptoms of nocturnal snoring, waking up choking and witnessed apneas.  Based on the clinical history, the patient has a high pre-test probability of having Severe MARK.  SLEEP STUDY FINDINGS: Patient underwent a 1 night Home Sleep Test and by behavioral criteria, slept for approximately  6.44 hours, with a sleep latency of 1 minutes and a sleep efficiency of 92%. Severe sleep disordered breathing (AHI=36) is  noted based on a 4% hypopnea desaturation criteria. When considering more subtle measures of sleep disordered  breathing, the overall respiratory disturbance index is severe(RDI=48) based on a 1% hypopnea desaturation criteria with  confirmation by surrogate arousal indicators. The apneas/hypopneas are accompanied by minimal oxygen desaturation  (percent time below 90% SpO2: 2.4%, Min SpO2: 83.1%). The average desaturation across all sleep disordered breathing  events is 4.4%. Snoring occurs for 29.2% (30 dB) of the study. The mean pulse rate is 54.7 BPM, with frequent pulse rate  variability (52 events with >= 6 BPM increase/decrease per hour).  TREATMENT CONSIDERATIONS: Consider nasal continuous positive airway pressure (CPAP/AutoPAP) as the initial  treatment choice for Severe obstructive sleep apnea. A mandibular advancement splint (MAS) or referral to an ENT  surgeon for modification to the airway should be considered to reduce the risk of mortality caused by Severe MARK if the  patient prefers an alternative therapy or the CPAP trial is  unsuccessful.

## 2022-09-09 NOTE — Clinical Note
PHYSICIAN INTERPRETATION AND COMMENTS: Findings are consistent with severe obstructive sleep apnea (MARK). CPAP indicated. Please refer to sleep disorders for prompt attention CLINICAL HISTORY: 67 year old male presented with: 16 inch neck, BMI of 36.6, an [place] sleepiness score of 3, history of hypertension, a previous diagnosis of MARK and symptoms of nocturnal snoring, waking up choking and witnessed apneas. Based on the clinical history, the patient has a high pre-test probability of having Severe MARK.

## 2022-09-12 ENCOUNTER — TELEPHONE (OUTPATIENT)
Dept: FAMILY MEDICINE | Facility: CLINIC | Age: 67
End: 2022-09-12
Payer: MEDICARE

## 2022-09-12 DIAGNOSIS — N28.1 RENAL CYST: ICD-10-CM

## 2022-09-12 DIAGNOSIS — R91.1 PULMONARY NODULE: Primary | ICD-10-CM

## 2022-09-12 DIAGNOSIS — K76.89 LIVER CYST: ICD-10-CM

## 2022-09-12 NOTE — TELEPHONE ENCOUNTER
----- Message from Caroline Hanley sent at 9/12/2022 10:36 AM CDT -----  Contact: [phone] Patient  Patient is returning a phone call.  Who left a message for the patient: Loreta Elias LPN  Does patient know what this is regarding:  US appt  Would you like a call back, or a response through your [facility] portal?:   call back  Comments:

## 2022-09-12 NOTE — TELEPHONE ENCOUNTER
----- Message from RT Briana sent at 9/12/2022  9:19 AM CDT -----  Good morning. The patient had a CT Chest w/o contrast and the radiologist is recommending another CT Chest w/o contrast within 3-6 months due to the finding of multiple scattered solid pulmonary nodules. Could you please follow up with the patient and schedule as needed? Thank you.

## 2022-09-12 NOTE — TELEPHONE ENCOUNTER
----- Message from Loreta Elias LPN sent at 9/12/2022  9:52 AM CDT -----    ----- Message -----  From: RT Briana  Sent: 9/12/2022   9:23 AM CDT  To: Gilda Jamison    Good morning. The patient had a CT Chest w/o contrast and the radiologist is recommending another CT Chest w/o contrast within 3-6 months due to the finding of multiple scattered solid pulmonary nodules. Could you please follow up with the patient and schedule as needed? Thank you.

## 2022-09-12 NOTE — TELEPHONE ENCOUNTER
I have signed for the following orders AND/OR meds.  Please call the patient and ask the patient to schedule the testing AND/OR inform about any medications that were sent.      Orders Placed This Encounter   Procedures    CT Chest Without Contrast     Standing Status:   Future     Standing Expiration Date:   9/12/2023     Order Specific Question:   May the Radiologist modify the order per protocol to meet the clinical needs of the patient?     Answer:   Yes    US Abdomen Complete     Standing Status:   Future     Standing Expiration Date:   9/12/2023     Scheduling Instructions:      Probable left hepatic lobe cyst and bilateral renal cysts.  Consider confirmation with abdominal/renal ultrasound.     Order Specific Question:   May the Radiologist modify the order per protocol to meet the clinical needs of the patient?     Answer:   Yes     Order Specific Question:   Release to patient     Answer:   Immediate    Ambulatory referral/consult to Pulmonology     Standing Status:   Future     Standing Expiration Date:   10/12/2023     Referral Priority:   Routine     Referral Type:   Consultation     Referral Reason:   Specialty Services Required     Requested Specialty:   Pulmonary Disease     Number of Visits Requested:   1

## 2022-09-14 NOTE — PROGRESS NOTES
1st check to see if patient has seen the results.  If not then  CALL patient with results and Document verification.  Schedule follow-up if needed.  [phone]  CT scan of the chest reviewed by Radiology.  There are multiple nodules present that need to be followed for stability.  Recommendation is to repeat CT chest in 3 to 6 months.  I recommend that she follow up with pulmonology for further evaluation and treatment.    There were also some incidental findings of liver and renal cysts.  I have ordered an ultrasound for confirmation/further evaluation.    Please have these tests performed and follow up with specialist.  Follow up with me after tests have been performed so we can discuss in detail.

## 2022-09-14 NOTE — PROGRESS NOTES
1st check to see if patient has seen the results.  If not then  CALL patient with results and Document verification.  Schedule follow-up if needed.  [phone]  Home sleep study has been reviewed.  It does confirm severe sleep apnea.  I recommend that you resume CPAP as soon as possible with previous settings.  I also recommend that she follow up with pulmonary specialist for further management.  A referral has been placed.    Home Sleep Studies     Date/Time: 9/9/2022 8:00 AM  Performed by: Yuval Peña MD  Authorized by: Jared Vo MD      PHYSICIAN INTERPRETATION AND COMMENTS: Findings are consistent with severe obstructive sleep apnea (MARK). CPAP  indicated. Please refer to sleep disorders for prompt attention  CLINICAL HISTORY: 67 year old male presented with: 16 inch neck, BMI of 36.6, an [place] sleepiness score of 3, history of  hypertension, a previous diagnosis of MARK and symptoms of nocturnal snoring, waking up choking and witnessed apneas.  Based on the clinical history, the patient has a high pre-test probability of having Severe MARK.  SLEEP STUDY FINDINGS: Patient underwent a 1 night Home Sleep Test and by behavioral criteria, slept for approximately  6.44 hours, with a sleep latency of 1 minutes and a sleep efficiency of 92%. Severe sleep disordered breathing (AHI=36) is  noted based on a 4% hypopnea desaturation criteria. When considering more subtle measures of sleep disordered  breathing, the overall respiratory disturbance index is severe(RDI=48) based on a 1% hypopnea desaturation criteria with  confirmation by surrogate arousal indicators. The apneas/hypopneas are accompanied by minimal oxygen desaturation  (percent time below 90% SpO2: 2.4%, Min SpO2: 83.1%). The average desaturation across all sleep disordered breathing  events is 4.4%. Snoring occurs for 29.2% (30 dB) of the study. The mean pulse rate is 54.7 BPM, with frequent pulse rate  variability (52 events with >= 6 BPM  increase/decrease per hour).  TREATMENT CONSIDERATIONS: Consider nasal continuous positive airway pressure (CPAP/AutoPAP) as the initial  treatment choice for Severe obstructive sleep apnea. A mandibular advancement splint (MAS) or referral to an ENT  surgeon for modification to the airway should be considered to reduce the risk of mortality caused by Severe MARK if the  patient prefers an alternative therapy or the CPAP trial is unsuccessful.

## 2022-09-16 ENCOUNTER — HOSPITAL ENCOUNTER (OUTPATIENT)
Dept: RADIOLOGY | Facility: HOSPITAL | Age: 67
Discharge: HOME | End: 2022-09-16
Attending: FAMILY MEDICINE
Payer: MEDICARE

## 2022-09-16 DIAGNOSIS — K76.89 LIVER CYST: ICD-10-CM

## 2022-09-16 DIAGNOSIS — N28.1 RENAL CYST: ICD-10-CM

## 2022-09-16 PROCEDURE — 76700 US EXAM ABDOM COMPLETE: CPT | Mod: TC,PO

## 2022-09-16 PROCEDURE — 76700 US EXAM ABDOM COMPLETE: CPT | Mod: 26,,, | Performed by: RADIOLOGY

## 2022-09-16 PROCEDURE — 76700 US ABDOMEN COMPLETE: ICD-10-PCS | Mod: 26,,, | Performed by: RADIOLOGY

## 2022-09-18 ENCOUNTER — PATIENT MESSAGE (OUTPATIENT)
Dept: FAMILY MEDICINE | Facility: CLINIC | Age: 67
End: 2022-09-18
Payer: MEDICARE

## 2022-09-18 NOTE — PROGRESS NOTES
1st check to see if patient has seen the results.  If not then  CALL patient with results and Document verification.  Schedule follow-up if needed.  [phone]  Ultrasound of the liver reviewed by radiology.  The liver is showing signs of fatty liver.  I recommend lifestyle modification with low-fat high-fiber diet and increase in exercise to promote weight loss.  This will help with your liver functioning.  Continue monitoring liver enzymes the routine blood work.  There is incidental findings of a cyst in both kidneys.  Also there is a cyst in the left lobe of the liver.  These appear to be simple cyst.  I recommend rechecking ultrasound in six months to one year.  Will discuss in detail follow-up office visit.  Follow-up sooner if having any issues.

## 2022-09-19 ENCOUNTER — OFFICE VISIT (OUTPATIENT)
Dept: FAMILY MEDICINE | Facility: CLINIC | Age: 67
End: 2022-09-19
Payer: MEDICARE

## 2022-09-19 VITALS
HEART RATE: 72 BPM | OXYGEN SATURATION: 95 % | BODY MASS INDEX: 37.56 KG/M2 | DIASTOLIC BLOOD PRESSURE: 86 MMHG | HEIGHT: 68 IN | TEMPERATURE: 98 F | SYSTOLIC BLOOD PRESSURE: 130 MMHG | WEIGHT: 247.81 LBS | RESPIRATION RATE: 16 BRPM

## 2022-09-19 DIAGNOSIS — Z79.899 ENCOUNTER FOR LONG-TERM (CURRENT) USE OF MEDICATIONS: ICD-10-CM

## 2022-09-19 DIAGNOSIS — I10 HYPERTENSION, ESSENTIAL: Primary | ICD-10-CM

## 2022-09-19 DIAGNOSIS — N28.1 RENAL CYST: ICD-10-CM

## 2022-09-19 DIAGNOSIS — K76.89 LIVER CYST: ICD-10-CM

## 2022-09-19 DIAGNOSIS — K76.0 HEPATIC STEATOSIS: ICD-10-CM

## 2022-09-19 PROCEDURE — 3288F FALL RISK ASSESSMENT DOCD: CPT | Mod: CPTII,S$GLB,, | Performed by: FAMILY MEDICINE

## 2022-09-19 PROCEDURE — G0008 ADMIN INFLUENZA VIRUS VAC: HCPCS | Mod: S$GLB,,, | Performed by: FAMILY MEDICINE

## 2022-09-19 PROCEDURE — 99214 PR OFFICE/OUTPT VISIT, EST, LEVL IV, 30-39 MIN: ICD-10-PCS | Mod: 25,S$GLB,, | Performed by: FAMILY MEDICINE

## 2022-09-19 PROCEDURE — 3075F PR MOST RECENT SYSTOLIC BLOOD PRESS GE 130-139MM HG: ICD-10-PCS | Mod: CPTII,S$GLB,, | Performed by: FAMILY MEDICINE

## 2022-09-19 PROCEDURE — 3075F SYST BP GE 130 - 139MM HG: CPT | Mod: CPTII,S$GLB,, | Performed by: FAMILY MEDICINE

## 2022-09-19 PROCEDURE — 1101F PR PT FALLS ASSESS DOC 0-1 FALLS W/OUT INJ PAST YR: ICD-10-PCS | Mod: CPTII,S$GLB,, | Performed by: FAMILY MEDICINE

## 2022-09-19 PROCEDURE — 1159F PR MEDICATION LIST DOCUMENTED IN MEDICAL RECORD: ICD-10-PCS | Mod: CPTII,S$GLB,, | Performed by: FAMILY MEDICINE

## 2022-09-19 PROCEDURE — 3079F DIAST BP 80-89 MM HG: CPT | Mod: CPTII,S$GLB,, | Performed by: FAMILY MEDICINE

## 2022-09-19 PROCEDURE — 1126F AMNT PAIN NOTED NONE PRSNT: CPT | Mod: CPTII,S$GLB,, | Performed by: FAMILY MEDICINE

## 2022-09-19 PROCEDURE — 3044F PR MOST RECENT HEMOGLOBIN A1C LEVEL <7.0%: ICD-10-PCS | Mod: CPTII,S$GLB,, | Performed by: FAMILY MEDICINE

## 2022-09-19 PROCEDURE — 3288F PR FALLS RISK ASSESSMENT DOCUMENTED: ICD-10-PCS | Mod: CPTII,S$GLB,, | Performed by: FAMILY MEDICINE

## 2022-09-19 PROCEDURE — 1159F MED LIST DOCD IN RCRD: CPT | Mod: CPTII,S$GLB,, | Performed by: FAMILY MEDICINE

## 2022-09-19 PROCEDURE — 99999 PR PBB SHADOW E&M-EST. PATIENT-LVL V: CPT | Mod: PBBFAC,,, | Performed by: FAMILY MEDICINE

## 2022-09-19 PROCEDURE — 99214 OFFICE O/P EST MOD 30 MIN: CPT | Mod: 25,S$GLB,, | Performed by: FAMILY MEDICINE

## 2022-09-19 PROCEDURE — 1101F PT FALLS ASSESS-DOCD LE1/YR: CPT | Mod: CPTII,S$GLB,, | Performed by: FAMILY MEDICINE

## 2022-09-19 PROCEDURE — G0008 FLU VACCINE - QUADRIVALENT - ADJUVANTED: ICD-10-PCS | Mod: S$GLB,,, | Performed by: FAMILY MEDICINE

## 2022-09-19 PROCEDURE — 3079F PR MOST RECENT DIASTOLIC BLOOD PRESSURE 80-89 MM HG: ICD-10-PCS | Mod: CPTII,S$GLB,, | Performed by: FAMILY MEDICINE

## 2022-09-19 PROCEDURE — 1126F PR PAIN SEVERITY QUANTIFIED, NO PAIN PRESENT: ICD-10-PCS | Mod: CPTII,S$GLB,, | Performed by: FAMILY MEDICINE

## 2022-09-19 PROCEDURE — 99999 PR PBB SHADOW E&M-EST. PATIENT-LVL V: ICD-10-PCS | Mod: PBBFAC,,, | Performed by: FAMILY MEDICINE

## 2022-09-19 PROCEDURE — 90694 VACC AIIV4 NO PRSRV 0.5ML IM: CPT | Mod: S$GLB,,, | Performed by: FAMILY MEDICINE

## 2022-09-19 PROCEDURE — 3008F BODY MASS INDEX DOCD: CPT | Mod: CPTII,S$GLB,, | Performed by: FAMILY MEDICINE

## 2022-09-19 PROCEDURE — 3008F PR BODY MASS INDEX (BMI) DOCUMENTED: ICD-10-PCS | Mod: CPTII,S$GLB,, | Performed by: FAMILY MEDICINE

## 2022-09-19 PROCEDURE — 90694 FLU VACCINE - QUADRIVALENT - ADJUVANTED: ICD-10-PCS | Mod: S$GLB,,, | Performed by: FAMILY MEDICINE

## 2022-09-19 PROCEDURE — 3044F HG A1C LEVEL LT 7.0%: CPT | Mod: CPTII,S$GLB,, | Performed by: FAMILY MEDICINE

## 2022-09-19 NOTE — PATIENT INSTRUCTIONS
Follow up if symptoms worsen or fail to improve, for As scheduled.     Dear patient,   As a result of recent federal legislation (The Federal Cures Act), you may receive lab or pathology results from your visit in your [facility] account before your physician is able to contact you. Your physician or their representative will relay the results to you with their recommendations at their soonest availability.     If no improvement in symptoms or symptoms worsen, please be advised to call MD, follow-up at clinic and/or go to ER if becomes severe.    Jared Vo M.D.        We Offer TELEHEALTH & Same Day Appointments!   Book your Telehealth appointment with me through my nurse or   Clinic appointments on [employer]!    [address] [place] [address]    Office: [phone]   FAX: [phone]    Check out my Facebook Page and Follow Me at: [email or web address]    Check out my website at [employer] by clicking on: [email or web address]    To Schedule appointments online, go to [email or web address]: [email or web address]

## 2022-09-19 NOTE — PROGRESS NOTES
PLAN:      Problem List Items Addressed This Visit       Hypertension, essential - Primary (Chronic)     Blood pressure well controlled.Counseled on importance of hypertension disease course, I recommend ongoing Education for DASH-diet and exercise.  Counseled on medication regimen importance of treating high blood pressure.  Please be advised of risk of untreated blood pressure as discussed.  Please advised of ER precautions were given for symptoms of hypertensive urgency and emergency.           Encounter for long-term (current) use of medications (Chronic)     Complete history and physical was completed today.  Complete and thorough medication reconciliation was performed.  Discussed risks and benefits of medications.  Advised patient on orders and health maintenance.  We discussed old records and old labs if available.  Will request any records not available through epic.  Continue current medications listed on your summary sheet.           Hepatic steatosis     Patient advised to incorporate lifestyle modification with diet and exercise to promote weight loss.  Following liver enzymes which have been normal.    Repeat ultrasound in 6 to 12 months.         Renal cyst     Recheck ultrasound in six months for stability.         Relevant Orders    US Abdomen Complete    Liver cyst     Repeat ultrasound of the abdomen in six months for stability.         Relevant Orders    US Abdomen Complete     Future Appointments       Date Provider Specialty Appt Notes    3/20/2023  Radiology     3/20/2023  Lab     3/27/2023 Jared Vo MD Family Medicine 6 month f/u            Medication Management for assessment above:   Medication List with Changes/Refills   Current Medications    AMLODIPINE (NORVASC) 5 MG TABLET    amlodipine 5 mg tablet   TAKE 1 TABLET BY MOUTH EVERY DAY    LOSARTAN-HYDROCHLOROTHIAZIDE 100-25 MG (HYZAAR) 100-25 MG PER TABLET    Take 1 tablet by mouth once daily.       Jared Vo,  M.D.  ==========================================================================  Subjective:   Patient ID: Skinny Del Cid is a 67 y.o. male.  has a past medical history of Hypertension, Lung nodule, and MARK (obstructive sleep apnea).   Chief Complaint: Discuss Results      Problem List Items Addressed This Visit       Hypertension, essential - Primary (Chronic)    Overview     CHRONIC. STABLE. BP Reviewed.  Compliant with BP medications. No SE reported.   (-) CP, SOB, palpitations, dizziness, lightheadedness, HA, arm numbness, tingling or weakness, syncope.  Creatinine   Date Value Ref Range Status   02/07/2022 0.8 0.5 - 1.4 mg/dL Final            Current Assessment & Plan     Blood pressure well controlled.Counseled on importance of hypertension disease course, I recommend ongoing Education for DASH-diet and exercise.  Counseled on medication regimen importance of treating high blood pressure.  Please be advised of risk of untreated blood pressure as discussed.  Please advised of ER precautions were given for symptoms of hypertensive urgency and emergency.           Encounter for long-term (current) use of medications (Chronic)    Overview     September 2022: Reviewed labs.  CHRONIC. Stable. Compliant with medications for managed conditions. See medication list. No SE reported.   Routine lab analysis is being monitored. Refills were addressed.  Lab Results   Component Value Date    HGB 15.1 02/07/2022       Chemistry        Component Value Date/Time     02/07/2022 0805    K 4.2 02/07/2022 0805     02/07/2022 0805    CO2 28 02/07/2022 0805    BUN 15 02/07/2022 0805    CREATININE 0.8 02/07/2022 0805     02/07/2022 0805        Component Value Date/Time    CALCIUM 10.1 02/07/2022 0805    ALKPHOS 49 (L) 02/07/2022 0805    AST 21 02/07/2022 0805    ALT 28 02/07/2022 0805    BILITOT 0.4 02/07/2022 0805    ESTGFRAFRICA >60.0 02/07/2022 0805    EGFRNONAA >60.0 02/07/2022 0805          No  results found for: TSH, L2ZUQHM, I4WXSBA, THYROIDAB, FREET4, T3FREE           Current Assessment & Plan     Complete history and physical was completed today.  Complete and thorough medication reconciliation was performed.  Discussed risks and benefits of medications.  Advised patient on orders and health maintenance.  We discussed old records and old labs if available.  Will request any records not available through epic.  Continue current medications listed on your summary sheet.           Hepatic steatosis    Overview     Reviewed ultrasound results with the patient.  Patient reports that he has already started diet and exercise.    US Abdomen Complete  Narrative: EXAMINATION:  US ABDOMEN COMPLETE    CLINICAL HISTORY:  Cyst of kidney, acquired    COMPARISON:  CT chest 09/09/2022    FINDINGS:  Liver measures 16.8 cm with slightly increased echogenicity.  Anechoic left lobe liver cyst measures up to 5.2 cm.  No focal hepatic mass or intrahepatic ductal dilatation.  Normal directional flow is in the main portal vein.    Pancreas and abdominal aorta are mostly obscured by bowel gas.  IVC is patent.    Gallbladder is surgically absent.  Common bile duct measures 0.8 cm.    Spleen measures 9.6 cm in length.    Right kidney measures 14.0 cm.  Left kidney measures 15.1 cm.  No stones or hydronephrosis.  Renal echogenicity is normal.  There are anechoic cysts on both kidneys measuring up to 8.4 cm on the left kidney lower pole.  Impression: 1. Simple renal and hepatic cysts.  2. Mild hepatic steatosis.    Electronically signed by: Jorge L Ocampo MD  Date:    09/16/2022  Time:    13:40             Current Assessment & Plan     Patient advised to incorporate lifestyle modification with diet and exercise to promote weight loss.  Following liver enzymes which have been normal.    Repeat ultrasound in 6 to 12 months.         Renal cyst    Overview     Simple cyst Seen on abdominal ultrasound.         Current Assessment & Plan  "    Recheck ultrasound in six months for stability.         Liver cyst    Overview     Seen on ultrasound of the abdomen.         Current Assessment & Plan     Repeat ultrasound of the abdomen in six months for stability.             Review of patient's allergies indicates:  No Known Allergies  Current Outpatient Medications   Medication Instructions    amLODIPine (NORVASC) 5 MG tablet amlodipine 5 mg tablet   TAKE 1 TABLET BY MOUTH EVERY DAY    losartan-hydrochlorothiazide 100-25 mg (HYZAAR) 100-25 mg per tablet 1 tablet, Oral, Daily      I have reviewed the PMH, social history, FamilyHx, surgical history, allergies and medications documented / confirmed by the patient at the time of this visit.  Review of Systems   Constitutional:  Negative for activity change and unexpected weight change.   HENT:  Negative for hearing loss, rhinorrhea and trouble swallowing.    Eyes:  Negative for discharge and visual disturbance.   Respiratory:  Negative for chest tightness and wheezing.    Cardiovascular:  Negative for chest pain and palpitations.   Gastrointestinal:  Negative for blood in stool, constipation, diarrhea and vomiting.   Endocrine: Negative for polydipsia and polyuria.   Genitourinary:  Negative for difficulty urinating, hematuria and urgency.   Musculoskeletal:  Negative for arthralgias, joint swelling and neck pain.   Neurological:  Negative for weakness and headaches.   Psychiatric/Behavioral:  Negative for confusion and dysphoric mood.    Objective:   /86   Pulse 72   Temp 98 °F (36.7 °C) (Oral)   Resp 16   Ht 5' 8" (1.727 m)   Wt 112.4 kg (247 lb 12.8 oz)   SpO2 95%   BMI 37.68 kg/m²   Physical Exam  Vitals and nursing note reviewed.   Constitutional:       General: He is not in acute distress.     Appearance: He is well-developed. He is not diaphoretic.   HENT:      Head: Normocephalic and atraumatic.      Right Ear: External ear normal.      Left Ear: External ear normal.      Nose: Nose " normal. No rhinorrhea.   Eyes:      Extraocular Movements: Extraocular movements intact.      Pupils: Pupils are equal, round, and reactive to light.   Neck:      Comments: Enlarged neck circumference  Cardiovascular:      Rate and Rhythm: Normal rate.      Pulses: Normal pulses.   Pulmonary:      Effort: Pulmonary effort is normal. No respiratory distress.      Breath sounds: Normal breath sounds.   Abdominal:      General: Bowel sounds are normal. There is no distension.      Palpations: Abdomen is soft.      Tenderness: There is no abdominal tenderness. There is no guarding.   Musculoskeletal:         General: Normal range of motion.      Cervical back: Normal range of motion and neck supple.   Skin:     General: Skin is warm and dry.      Capillary Refill: Capillary refill takes less than 2 seconds.      Findings: No rash.   Neurological:      General: No focal deficit present.      Mental Status: He is alert and oriented to person, place, and time. Mental status is at baseline.      Cranial Nerves: No cranial nerve deficit.      Motor: No weakness.      Gait: Gait normal.   Psychiatric:         Attention and Perception: He is attentive.         Mood and Affect: Mood normal. Mood is not anxious or depressed. Affect is not labile, blunt, angry or inappropriate.         Speech: He is communicative. Speech is not rapid and pressured, delayed, slurred or tangential.         Behavior: Behavior normal. Behavior is not agitated, slowed, aggressive, withdrawn, hyperactive or combative.         Thought Content: Thought content normal. Thought content is not paranoid or delusional. Thought content does not include homicidal or suicidal ideation. Thought content does not include homicidal or suicidal plan.         Cognition and Memory: Memory is not impaired.         Judgment: Judgment normal. Judgment is not impulsive or inappropriate.       Assessment:     1. Hypertension, essential    2. Encounter for long-term  (current) use of medications    3. Hepatic steatosis    4. Renal cyst    5. Liver cyst      MDM:   Moderate complexity.  Moderate risk.  Total time: 32 minutes.  This includes total time spent on the encounter, which includes face to face time and non-face to face time preparing to see the patient (eg, review of previous medical records, tests), Obtaining and/or reviewing separately obtained history, documenting clinical information in the electronic or other health record, independently interpreting results (not separately reported)/communicating results to the patient/family/caregiver, and/or care coordination (not separately reported).    I have Reviewed and summarized old records.  I have performed thorough medication reconciliation today and discussed risk and benefits of medications.  I have reviewed labs and discussed with patient.  All questions were answered.  I am requesting old records and will review them once they are available.  Pulmonary    I have signed for the following orders AND/OR meds.  Orders Placed This Encounter   Procedures    US Abdomen Complete     Standing Status:   Future     Standing Expiration Date:   9/19/2023     Order Specific Question:   May the Radiologist modify the order per protocol to meet the clinical needs of the patient?     Answer:   Yes     Order Specific Question:   Release to patient     Answer:   Immediate    Influenza - Quadrivalent (Adjuvanted)           Follow up if symptoms worsen or fail to improve.  Future Appointments       Date Provider Specialty Appt Notes    3/20/2023  Radiology     3/20/2023  Lab     3/27/2023 Jared Vo MD Family Medicine 6 month f/u           If no improvement in symptoms or symptoms worsen, advised to call/follow-up at clinic or go to ER. Patient voiced understanding and all questions/concerns were addressed.   DISCLAIMER: This note was compiled by using a speech recognition dictation system and therefore please be aware that  typographical / speech recognition errors can and do occur.  Please contact me if you see any errors specifically.    Jared Vo M.D.       Office: [phone] [address] [place] [address]  FAX: [phone]

## 2022-09-19 NOTE — ASSESSMENT & PLAN NOTE
Blood pressure well controlled.Counseled on importance of hypertension disease course, I recommend ongoing Education for DASH-diet and exercise.  Counseled on medication regimen importance of treating high blood pressure.  Please be advised of risk of untreated blood pressure as discussed.  Please advised of ER precautions were given for symptoms of hypertensive urgency and emergency.

## 2022-09-19 NOTE — ASSESSMENT & PLAN NOTE
Patient advised to incorporate lifestyle modification with diet and exercise to promote weight loss.  Following liver enzymes which have been normal.    Repeat ultrasound in 6 to 12 months.

## 2022-09-20 ENCOUNTER — PATIENT MESSAGE (OUTPATIENT)
Dept: PULMONOLOGY | Facility: CLINIC | Age: 67
End: 2022-09-20
Payer: MEDICARE

## 2022-10-11 ENCOUNTER — PATIENT OUTREACH (OUTPATIENT)
Dept: ADMINISTRATIVE | Facility: HOSPITAL | Age: 67
End: 2022-10-11
Payer: MEDICARE

## 2022-10-24 ENCOUNTER — PATIENT MESSAGE (OUTPATIENT)
Dept: FAMILY MEDICINE | Facility: CLINIC | Age: 67
End: 2022-10-24
Payer: MEDICARE

## 2022-10-24 RX ORDER — PANTOPRAZOLE SODIUM 20 MG/1
20 TABLET, DELAYED RELEASE ORAL DAILY
Qty: 90 TABLET | Refills: 3 | Status: SHIPPED | OUTPATIENT
Start: 2022-10-24 | End: 2023-03-29 | Stop reason: SDUPTHER

## 2022-10-24 NOTE — TELEPHONE ENCOUNTER
No new care gaps identified.  [facility] Embedded Care Gaps. Reference number: [id number]. 10/24/2022   2:20:44 PM CDT

## 2022-11-29 ENCOUNTER — OFFICE VISIT (OUTPATIENT)
Dept: FAMILY MEDICINE | Facility: CLINIC | Age: 67
End: 2022-11-29
Payer: MEDICARE

## 2022-11-29 VITALS
HEART RATE: 74 BPM | TEMPERATURE: 98 F | BODY MASS INDEX: 35.77 KG/M2 | HEIGHT: 68 IN | WEIGHT: 236 LBS | SYSTOLIC BLOOD PRESSURE: 121 MMHG | DIASTOLIC BLOOD PRESSURE: 81 MMHG

## 2022-11-29 DIAGNOSIS — Z79.899 ENCOUNTER FOR LONG-TERM (CURRENT) USE OF MEDICATIONS: ICD-10-CM

## 2022-11-29 DIAGNOSIS — F41.8 ANXIOUS DEPRESSION: Primary | ICD-10-CM

## 2022-11-29 DIAGNOSIS — I10 HYPERTENSION, ESSENTIAL: ICD-10-CM

## 2022-11-29 DIAGNOSIS — R41.3 MEMORY LOSS: ICD-10-CM

## 2022-11-29 PROCEDURE — 3079F PR MOST RECENT DIASTOLIC BLOOD PRESSURE 80-89 MM HG: ICD-10-PCS | Mod: CPTII,S$GLB,, | Performed by: FAMILY MEDICINE

## 2022-11-29 PROCEDURE — 1101F PR PT FALLS ASSESS DOC 0-1 FALLS W/OUT INJ PAST YR: ICD-10-PCS | Mod: CPTII,S$GLB,, | Performed by: FAMILY MEDICINE

## 2022-11-29 PROCEDURE — 1160F PR REVIEW ALL MEDS BY PRESCRIBER/CLIN PHARMACIST DOCUMENTED: ICD-10-PCS | Mod: CPTII,S$GLB,, | Performed by: FAMILY MEDICINE

## 2022-11-29 PROCEDURE — 1159F MED LIST DOCD IN RCRD: CPT | Mod: CPTII,S$GLB,, | Performed by: FAMILY MEDICINE

## 2022-11-29 PROCEDURE — 3074F PR MOST RECENT SYSTOLIC BLOOD PRESSURE < 130 MM HG: ICD-10-PCS | Mod: CPTII,S$GLB,, | Performed by: FAMILY MEDICINE

## 2022-11-29 PROCEDURE — 99999 PR PBB SHADOW E&M-EST. PATIENT-LVL IV: CPT | Mod: PBBFAC,,, | Performed by: FAMILY MEDICINE

## 2022-11-29 PROCEDURE — 99214 PR OFFICE/OUTPT VISIT, EST, LEVL IV, 30-39 MIN: ICD-10-PCS | Mod: S$GLB,,, | Performed by: FAMILY MEDICINE

## 2022-11-29 PROCEDURE — 3044F HG A1C LEVEL LT 7.0%: CPT | Mod: CPTII,S$GLB,, | Performed by: FAMILY MEDICINE

## 2022-11-29 PROCEDURE — 3079F DIAST BP 80-89 MM HG: CPT | Mod: CPTII,S$GLB,, | Performed by: FAMILY MEDICINE

## 2022-11-29 PROCEDURE — 3044F PR MOST RECENT HEMOGLOBIN A1C LEVEL <7.0%: ICD-10-PCS | Mod: CPTII,S$GLB,, | Performed by: FAMILY MEDICINE

## 2022-11-29 PROCEDURE — 1101F PT FALLS ASSESS-DOCD LE1/YR: CPT | Mod: CPTII,S$GLB,, | Performed by: FAMILY MEDICINE

## 2022-11-29 PROCEDURE — 3008F PR BODY MASS INDEX (BMI) DOCUMENTED: ICD-10-PCS | Mod: CPTII,S$GLB,, | Performed by: FAMILY MEDICINE

## 2022-11-29 PROCEDURE — 1126F PR PAIN SEVERITY QUANTIFIED, NO PAIN PRESENT: ICD-10-PCS | Mod: CPTII,S$GLB,, | Performed by: FAMILY MEDICINE

## 2022-11-29 PROCEDURE — 1159F PR MEDICATION LIST DOCUMENTED IN MEDICAL RECORD: ICD-10-PCS | Mod: CPTII,S$GLB,, | Performed by: FAMILY MEDICINE

## 2022-11-29 PROCEDURE — 99214 OFFICE O/P EST MOD 30 MIN: CPT | Mod: S$GLB,,, | Performed by: FAMILY MEDICINE

## 2022-11-29 PROCEDURE — 3008F BODY MASS INDEX DOCD: CPT | Mod: CPTII,S$GLB,, | Performed by: FAMILY MEDICINE

## 2022-11-29 PROCEDURE — 1160F RVW MEDS BY RX/DR IN RCRD: CPT | Mod: CPTII,S$GLB,, | Performed by: FAMILY MEDICINE

## 2022-11-29 PROCEDURE — 3288F FALL RISK ASSESSMENT DOCD: CPT | Mod: CPTII,S$GLB,, | Performed by: FAMILY MEDICINE

## 2022-11-29 PROCEDURE — 3074F SYST BP LT 130 MM HG: CPT | Mod: CPTII,S$GLB,, | Performed by: FAMILY MEDICINE

## 2022-11-29 PROCEDURE — 99999 PR PBB SHADOW E&M-EST. PATIENT-LVL IV: ICD-10-PCS | Mod: PBBFAC,,, | Performed by: FAMILY MEDICINE

## 2022-11-29 PROCEDURE — 3288F PR FALLS RISK ASSESSMENT DOCUMENTED: ICD-10-PCS | Mod: CPTII,S$GLB,, | Performed by: FAMILY MEDICINE

## 2022-11-29 PROCEDURE — 1126F AMNT PAIN NOTED NONE PRSNT: CPT | Mod: CPTII,S$GLB,, | Performed by: FAMILY MEDICINE

## 2022-11-29 RX ORDER — VENLAFAXINE HYDROCHLORIDE 37.5 MG/1
37.5 CAPSULE, EXTENDED RELEASE ORAL DAILY
Qty: 30 CAPSULE | Refills: 11 | Status: SHIPPED | OUTPATIENT
Start: 2022-11-29 | End: 2022-12-12 | Stop reason: SDUPTHER

## 2022-11-29 NOTE — ASSESSMENT & PLAN NOTE
Check folate B12 and thyroid.  Starting Effexor for depression which may fix his short-term memory issue.  If no improvement recommend supplementing with vitamins or memory supplement.    Consider neurology referral if no improvement.

## 2022-11-29 NOTE — PATIENT INSTRUCTIONS
Follow up in about 3 months (around 2/28/2023), or if symptoms worsen or fail to improve, for Annual Wellness Exam.     Dear patient,   As a result of recent federal legislation (The Federal Cures Act), you may receive lab or pathology results from your visit in your [facility] account before your physician is able to contact you. Your physician or their representative will relay the results to you with their recommendations at their soonest availability.     If no improvement in symptoms or symptoms worsen, please be advised to call MD, follow-up at clinic and/or go to ER if becomes severe.    Jared Vo M.D.        We Offer TELEHEALTH & Same Day Appointments!   Book your Telehealth appointment with me through my nurse or   Clinic appointments on [employer]!    [address] [place] [address]    Office: [phone]   FAX: [phone]    Check out my Facebook Page and Follow Me at: [email or web address]    Check out my website at [employer] by clicking on: [email or web address]    To Schedule appointments online, go to [employer]: [email or web address]

## 2022-11-29 NOTE — ASSESSMENT & PLAN NOTE
Check TSH.Complete history and physical was completed today.  Complete and thorough medication reconciliation was performed.  Discussed risks and benefits of medications.  Advised patient on orders and health maintenance.  We discussed old records and old labs if available.  Will request any records not available through epic.  Continue current medications listed on your summary sheet.

## 2022-11-29 NOTE — PROGRESS NOTES
PLAN:      Problem List Items Addressed This Visit       Hypertension, essential (Chronic)     Counseled on importance of hypertension disease course, I recommend ongoing Education for DASH-diet and exercise.  Counseled on medication regimen importance of treating high blood pressure.  Please be advised of risk of untreated blood pressure as discussed.  Please advised of ER precautions were given for symptoms of hypertensive urgency and emergency.           Encounter for long-term (current) use of medications (Chronic)     Check TSH.Complete history and physical was completed today.  Complete and thorough medication reconciliation was performed.  Discussed risks and benefits of medications.  Advised patient on orders and health maintenance.  We discussed old records and old labs if available.  Will request any records not available through epic.  Continue current medications listed on your summary sheet.           Anxious depression - Primary     Start Effexor.  Follow-up with message in two weeks.  Plan to titrate up if needed.    Please be advised of condition course.  SNRI/SSRI is first-line treatment for this condition.  Please be advised of the risk of discontinuing this medication without tapering/contacting MD.  Patient has been advised of side effects, and all questions were answered.  Patient voiced understanding.  Patient will follow up routinely and notify us if having any side effects or worsening or persistent symptoms.  ER precautions were given. Antidepressant/Antianxiety Medication Initiation:  Patient informed of risks, benefits, and potential side effects of medication and accepts informed consent.  Common side effects include nausea, fatigue, headache, insomnia, etc see medication insert for complete side effect profile.  Most importantly be advised of the possibility of new or worsening suicidal thoughts/depression/anxiety etcetera.  Please be advised to stop the medication immediately and seek  urgent treatment if this occurs.  Therefore please do not to abruptly discontinue medication without physician guidance except in cases of sudden onset or worsening of SI.            Relevant Medications    venlafaxine (EFFEXOR-XR) 37.5 MG 24 hr capsule    Other Relevant Orders    TSH    Vitamin B12    Folate    Memory loss     Check folate B12 and thyroid.  Starting Effexor for depression which may fix his short-term memory issue.  If no improvement recommend supplementing with vitamins or memory supplement.    Consider neurology referral if no improvement.         Relevant Medications    venlafaxine (EFFEXOR-XR) 37.5 MG 24 hr capsule    Other Relevant Orders    TSH    Vitamin B12    Folate     Future Appointments       Date Provider Specialty Appt Notes    3/20/2023  Radiology     3/20/2023  Lab     3/27/2023 Jared Vo MD Family Medicine 6 month f/u            Medication Management for assessment above:   Medication List with Changes/Refills   New Medications    VENLAFAXINE (EFFEXOR-XR) 37.5 MG 24 HR CAPSULE    Take 1 capsule (37.5 mg total) by mouth once daily.   Current Medications    AMLODIPINE (NORVASC) 5 MG TABLET    amlodipine 5 mg tablet   TAKE 1 TABLET BY MOUTH EVERY DAY    LOSARTAN-HYDROCHLOROTHIAZIDE 100-25 MG (HYZAAR) 100-25 MG PER TABLET    Take 1 tablet by mouth once daily.    PANTOPRAZOLE (PROTONIX) 20 MG TABLET    Take 1 tablet (20 mg total) by mouth once daily.       Jared Vo M.D.  ==========================================================================  Subjective:   Patient ID: Skinny Del Cid is a 67 y.o. male.  has a past medical history of Hypertension, Lung nodule, and MARK (obstructive sleep apnea).   Chief Complaint: Depression and Memory Loss      Problem List Items Addressed This Visit       Hypertension, essential (Chronic)    Overview     CHRONIC. STABLE. BP Reviewed.  Compliant with BP medications. No SE reported.   (-) CP, SOB, palpitations, dizziness,  lightheadedness, HA, arm numbness, tingling or weakness, syncope.  Creatinine   Date Value Ref Range Status   02/07/2022 0.8 0.5 - 1.4 mg/dL Final     Results for orders placed or performed in visit on 01/31/22   IN OFFICE EKG 12-LEAD (to [facility] [place])    Collection Time: 01/31/22 11:18 AM    Narrative    Test Reason : Z13.6,    Vent. Rate : 074 BPM     Atrial Rate : 074 BPM     P-R Int : 178 ms          QRS Dur : 094 ms      QT Int : 384 ms       P-R-T Axes : 012 046 017 degrees     QTc Int : 426 ms    Normal sinus rhythm  Normal ECG  No previous ECGs available  Confirmed by MARLO FATIMA, DONNA SONI ([id number]) on 2/1/2022 8:20:16 PM    Referred By: MD MILAN           Confirmed By:DONNA SELLERS MD            Current Assessment & Plan     Counseled on importance of hypertension disease course, I recommend ongoing Education for DASH-diet and exercise.  Counseled on medication regimen importance of treating high blood pressure.  Please be advised of risk of untreated blood pressure as discussed.  Please advised of ER precautions were given for symptoms of hypertensive urgency and emergency.           Encounter for long-term (current) use of medications (Chronic)    Overview     November 2022: Reviewed September 2022: Reviewed labs.  CHRONIC. Stable. Compliant with medications for managed conditions. See medication list. No SE reported.   Routine lab analysis is being monitored. Refills were addressed.  Lab Results   Component Value Date    HGB 15.1 02/07/2022         Chemistry        Component Value Date/Time     02/07/2022 0805    K 4.2 02/07/2022 0805     02/07/2022 0805    CO2 28 02/07/2022 0805    BUN 15 02/07/2022 0805    CREATININE 0.8 02/07/2022 0805     02/07/2022 0805        Component Value Date/Time    CALCIUM 10.1 02/07/2022 0805    ALKPHOS 49 (L) 02/07/2022 0805    AST 21 02/07/2022 0805    ALT 28 02/07/2022 0805    BILITOT 0.4 02/07/2022 0805    ESTGFRAFRICA >60.0 02/07/2022 0805    EGFRNONAA  >60.0 02/07/2022 0805          No results found for: TSH, J3JBGVT, X5XWMRO, THYROIDAB, FREET4, T3FREE           Current Assessment & Plan     Check TSH.Complete history and physical was completed today.  Complete and thorough medication reconciliation was performed.  Discussed risks and benefits of medications.  Advised patient on orders and health maintenance.  We discussed old records and old labs if available.  Will request any records not available through epic.  Continue current medications listed on your summary sheet.           Anxious depression - Primary    Overview     New problem.  Patient reports has been having worsening depression over the last few months.  Patient is taking care of his parents at his house which is taxing for his family.  Patient recently retired.  He has decreased interested doing things he enjoys.  Patient has been exercising at [employer].    He gets anxious and worried about his health.  Patient reports declining memory short-term.    Denies SI HI or hallucinations.         Current Assessment & Plan     Start Effexor.  Follow-up with message in two weeks.  Plan to titrate up if needed.    Please be advised of condition course.  SNRI/SSRI is first-line treatment for this condition.  Please be advised of the risk of discontinuing this medication without tapering/contacting MD.  Patient has been advised of side effects, and all questions were answered.  Patient voiced understanding.  Patient will follow up routinely and notify us if having any side effects or worsening or persistent symptoms.  ER precautions were given. Antidepressant/Antianxiety Medication Initiation:  Patient informed of risks, benefits, and potential side effects of medication and accepts informed consent.  Common side effects include nausea, fatigue, headache, insomnia, etc see medication insert for complete side effect profile.  Most importantly be advised of the possibility of new or worsening suicidal  thoughts/depression/anxiety etcetera.  Please be advised to stop the medication immediately and seek urgent treatment if this occurs.  Therefore please do not to abruptly discontinue medication without physician guidance except in cases of sudden onset or worsening of SI.            Memory loss    Overview     Associated with depression.         Current Assessment & Plan     Check folate B12 and thyroid.  Starting Effexor for depression which may fix his short-term memory issue.  If no improvement recommend supplementing with vitamins or memory supplement.    Consider neurology referral if no improvement.             Review of patient's allergies indicates:  No Known Allergies  Current Outpatient Medications   Medication Instructions    amLODIPine (NORVASC) 5 MG tablet amlodipine 5 mg tablet   TAKE 1 TABLET BY MOUTH EVERY DAY    losartan-hydrochlorothiazide 100-25 mg (HYZAAR) 100-25 mg per tablet 1 tablet, Oral, Daily    pantoprazole (PROTONIX) 20 mg, Oral, Daily    venlafaxine (EFFEXOR-XR) 37.5 mg, Oral, Daily      I have reviewed the PMH, social history, FamilyHx, surgical history, allergies and medications documented / confirmed by the patient at the time of this visit.  Review of Systems   Constitutional:  Negative for activity change and unexpected weight change.   HENT:  Negative for hearing loss, rhinorrhea and trouble swallowing.    Eyes:  Positive for visual disturbance. Negative for discharge.   Respiratory:  Negative for chest tightness and wheezing.    Cardiovascular:  Negative for chest pain and palpitations.   Gastrointestinal:  Negative for blood in stool, constipation, diarrhea and vomiting.   Endocrine: Negative for polydipsia and polyuria.   Genitourinary:  Negative for difficulty urinating, hematuria and urgency.   Musculoskeletal:  Negative for arthralgias, joint swelling and neck pain.   Neurological:  Negative for weakness and headaches.   Psychiatric/Behavioral:  Positive for dysphoric mood.  "Negative for confusion. The patient is nervous/anxious.    Objective:   /81   Pulse 74   Temp 98.2 °F (36.8 °C)   Ht 5' 8" (1.727 m)   Wt 107.1 kg (236 lb)   BMI 35.88 kg/m²   Physical Exam  Vitals and nursing note reviewed.   Constitutional:       General: He is not in acute distress.     Appearance: He is well-developed. He is not diaphoretic.   HENT:      Head: Normocephalic and atraumatic.      Right Ear: External ear normal.      Left Ear: External ear normal.      Nose: Nose normal. No rhinorrhea.   Eyes:      Extraocular Movements: Extraocular movements intact.      Pupils: Pupils are equal, round, and reactive to light.   Cardiovascular:      Rate and Rhythm: Normal rate.      Pulses: Normal pulses.   Pulmonary:      Effort: Pulmonary effort is normal. No respiratory distress.      Breath sounds: Normal breath sounds.   Musculoskeletal:         General: Normal range of motion.      Cervical back: Normal range of motion and neck supple.   Skin:     General: Skin is warm and dry.      Capillary Refill: Capillary refill takes less than 2 seconds.      Findings: No rash.   Neurological:      General: No focal deficit present.      Mental Status: He is alert and oriented to person, place, and time.   Psychiatric:         Attention and Perception: He is attentive.         Mood and Affect: Mood is anxious and depressed. Affect is not labile, blunt, angry or inappropriate.         Speech: He is communicative. Speech is not rapid and pressured, delayed, slurred or tangential.         Behavior: Behavior normal. Behavior is not agitated, slowed, aggressive, withdrawn, hyperactive or combative.         Thought Content: Thought content normal. Thought content is not paranoid or delusional. Thought content does not include homicidal or suicidal ideation. Thought content does not include homicidal or suicidal plan.         Cognition and Memory: Memory is not impaired.         Judgment: Judgment normal. Judgment " is not impulsive or inappropriate.       Assessment:     1. Anxious depression    2. Memory loss    3. Encounter for long-term (current) use of medications    4. Hypertension, essential      MDM:   Moderate complexity.  Moderate risk.  Total time: 32 minutes.  This includes total time spent on the encounter, which includes face to face time and non-face to face time preparing to see the patient (eg, review of previous medical records, tests), Obtaining and/or reviewing separately obtained history, documenting clinical information in the electronic or other health record, independently interpreting results (not separately reported)/communicating results to the patient/family/caregiver, and/or care coordination (not separately reported).    I have Reviewed and summarized old records.  I have performed thorough medication reconciliation today and discussed risk and benefits of medications.  I have reviewed labs and discussed with patient.  All questions were answered.  I am requesting old records and will review them once they are available.    I have signed for the following orders AND/OR meds.  Orders Placed This Encounter   Procedures    TSH     Standing Status:   Standing     Number of Occurrences:   99     Standing Expiration Date:   11/24/2042    Vitamin B12     Standing Status:   Future     Standing Expiration Date:   1/28/2024    Folate     Standing Status:   Future     Standing Expiration Date:   1/28/2024     Medications Ordered This Encounter   Medications    venlafaxine (EFFEXOR-XR) 37.5 MG 24 hr capsule     Sig: Take 1 capsule (37.5 mg total) by mouth once daily.     Dispense:  30 capsule     Refill:  11        Follow up in about 3 months (around 2/28/2023), or if symptoms worsen or fail to improve, for Annual Wellness Exam.  Future Appointments       Date Provider Specialty Appt Notes    3/20/2023  Radiology     3/20/2023  Lab     3/27/2023 Jared Vo MD Family Medicine 6 month f/u           If no  improvement in symptoms or symptoms worsen, advised to call/follow-up at clinic or go to ER. Patient voiced understanding and all questions/concerns were addressed.   DISCLAIMER: This note was compiled by using a speech recognition dictation system and therefore please be aware that typographical / speech recognition errors can and do occur.  Please contact me if you see any errors specifically.    Jared Vo M.D.       Office: [phone] [address] [place] [address]  FAX: [phone]

## 2022-11-29 NOTE — ASSESSMENT & PLAN NOTE
Start Effexor.  Follow-up with message in two weeks.  Plan to titrate up if needed.    Please be advised of condition course.  SNRI/SSRI is first-line treatment for this condition.  Please be advised of the risk of discontinuing this medication without tapering/contacting MD.  Patient has been advised of side effects, and all questions were answered.  Patient voiced understanding.  Patient will follow up routinely and notify us if having any side effects or worsening or persistent symptoms.  ER precautions were given. Antidepressant/Antianxiety Medication Initiation:  Patient informed of risks, benefits, and potential side effects of medication and accepts informed consent.  Common side effects include nausea, fatigue, headache, insomnia, etc see medication insert for complete side effect profile.  Most importantly be advised of the possibility of new or worsening suicidal thoughts/depression/anxiety etcetera.  Please be advised to stop the medication immediately and seek urgent treatment if this occurs.  Therefore please do not to abruptly discontinue medication without physician guidance except in cases of sudden onset or worsening of SI.

## 2022-12-12 ENCOUNTER — PATIENT MESSAGE (OUTPATIENT)
Dept: FAMILY MEDICINE | Facility: CLINIC | Age: 67
End: 2022-12-12
Payer: MEDICARE

## 2022-12-12 DIAGNOSIS — F41.8 ANXIOUS DEPRESSION: ICD-10-CM

## 2022-12-12 DIAGNOSIS — R41.3 MEMORY LOSS: ICD-10-CM

## 2022-12-12 RX ORDER — VENLAFAXINE HYDROCHLORIDE 37.5 MG/1
37.5 CAPSULE, EXTENDED RELEASE ORAL DAILY
Qty: 90 CAPSULE | Refills: 4 | Status: SHIPPED | OUTPATIENT
Start: 2022-12-12 | End: 2023-03-29 | Stop reason: SDUPTHER

## 2022-12-12 RX ORDER — AMLODIPINE BESYLATE 5 MG/1
TABLET ORAL
Qty: 90 TABLET | Refills: 4 | Status: SHIPPED | OUTPATIENT
Start: 2022-12-12 | End: 2023-03-29 | Stop reason: SDUPTHER

## 2022-12-12 NOTE — TELEPHONE ENCOUNTER
Care Due:                  Date            Visit Type   Department     Provider  --------------------------------------------------------------------------------                                MYCHART                              FOLLOWUP/OF  [facility] FAM[facility]  Last Visit: 11-      FICE VISIT   MEDICINE       Jared Vo                              EP -                              PRIMARY      [facility]  Next Visit: 03-      CARE ([facility])   MEDICINE       Jared Vo                                                            Last  Test          Frequency    Reason                     Performed    Due Date  --------------------------------------------------------------------------------    Cr..........  12 months..  venlafaxine..............  02- 02-    [facility] Embedded Care Gaps. Reference number: [id number]. 12/12/2022   10:31:50 AM CST

## 2023-01-09 ENCOUNTER — PATIENT MESSAGE (OUTPATIENT)
Dept: FAMILY MEDICINE | Facility: CLINIC | Age: 68
End: 2023-01-09
Payer: MEDICARE

## 2023-01-26 ENCOUNTER — TELEPHONE (OUTPATIENT)
Dept: FAMILY MEDICINE | Facility: CLINIC | Age: 68
End: 2023-01-26
Payer: MEDICARE

## 2023-01-27 ENCOUNTER — OFFICE VISIT (OUTPATIENT)
Dept: FAMILY MEDICINE | Facility: CLINIC | Age: 68
End: 2023-01-27
Payer: MEDICARE

## 2023-01-27 VITALS
WEIGHT: 235 LBS | SYSTOLIC BLOOD PRESSURE: 114 MMHG | OXYGEN SATURATION: 96 % | BODY MASS INDEX: 35.61 KG/M2 | DIASTOLIC BLOOD PRESSURE: 74 MMHG | HEART RATE: 80 BPM | HEIGHT: 68 IN

## 2023-01-27 DIAGNOSIS — Z79.899 ENCOUNTER FOR LONG-TERM (CURRENT) USE OF MEDICATIONS: ICD-10-CM

## 2023-01-27 DIAGNOSIS — I70.0 AORTIC ATHEROSCLEROSIS: ICD-10-CM

## 2023-01-27 DIAGNOSIS — G47.33 OSA ON CPAP: Primary | Chronic | ICD-10-CM

## 2023-01-27 DIAGNOSIS — E66.2 CLASS 2 OBESITY WITH ALVEOLAR HYPOVENTILATION, SERIOUS COMORBIDITY, AND BODY MASS INDEX (BMI) OF 36.0 TO 36.9 IN ADULT: ICD-10-CM

## 2023-01-27 PROCEDURE — 3288F FALL RISK ASSESSMENT DOCD: CPT | Mod: HCNC,CPTII,S$GLB, | Performed by: FAMILY MEDICINE

## 2023-01-27 PROCEDURE — 3074F PR MOST RECENT SYSTOLIC BLOOD PRESSURE < 130 MM HG: ICD-10-PCS | Mod: HCNC,CPTII,S$GLB, | Performed by: FAMILY MEDICINE

## 2023-01-27 PROCEDURE — 99214 OFFICE O/P EST MOD 30 MIN: CPT | Mod: HCNC,S$GLB,, | Performed by: FAMILY MEDICINE

## 2023-01-27 PROCEDURE — 1126F PR PAIN SEVERITY QUANTIFIED, NO PAIN PRESENT: ICD-10-PCS | Mod: HCNC,CPTII,S$GLB, | Performed by: FAMILY MEDICINE

## 2023-01-27 PROCEDURE — 99214 PR OFFICE/OUTPT VISIT, EST, LEVL IV, 30-39 MIN: ICD-10-PCS | Mod: HCNC,S$GLB,, | Performed by: FAMILY MEDICINE

## 2023-01-27 PROCEDURE — 1159F MED LIST DOCD IN RCRD: CPT | Mod: HCNC,CPTII,S$GLB, | Performed by: FAMILY MEDICINE

## 2023-01-27 PROCEDURE — 3078F PR MOST RECENT DIASTOLIC BLOOD PRESSURE < 80 MM HG: ICD-10-PCS | Mod: HCNC,CPTII,S$GLB, | Performed by: FAMILY MEDICINE

## 2023-01-27 PROCEDURE — 3078F DIAST BP <80 MM HG: CPT | Mod: HCNC,CPTII,S$GLB, | Performed by: FAMILY MEDICINE

## 2023-01-27 PROCEDURE — 3008F PR BODY MASS INDEX (BMI) DOCUMENTED: ICD-10-PCS | Mod: HCNC,CPTII,S$GLB, | Performed by: FAMILY MEDICINE

## 2023-01-27 PROCEDURE — 1159F PR MEDICATION LIST DOCUMENTED IN MEDICAL RECORD: ICD-10-PCS | Mod: HCNC,CPTII,S$GLB, | Performed by: FAMILY MEDICINE

## 2023-01-27 PROCEDURE — 1101F PR PT FALLS ASSESS DOC 0-1 FALLS W/OUT INJ PAST YR: ICD-10-PCS | Mod: HCNC,CPTII,S$GLB, | Performed by: FAMILY MEDICINE

## 2023-01-27 PROCEDURE — 3288F PR FALLS RISK ASSESSMENT DOCUMENTED: ICD-10-PCS | Mod: HCNC,CPTII,S$GLB, | Performed by: FAMILY MEDICINE

## 2023-01-27 PROCEDURE — 99999 PR PBB SHADOW E&M-EST. PATIENT-LVL III: CPT | Mod: PBBFAC,HCNC,, | Performed by: FAMILY MEDICINE

## 2023-01-27 PROCEDURE — 1101F PT FALLS ASSESS-DOCD LE1/YR: CPT | Mod: HCNC,CPTII,S$GLB, | Performed by: FAMILY MEDICINE

## 2023-01-27 PROCEDURE — 3008F BODY MASS INDEX DOCD: CPT | Mod: HCNC,CPTII,S$GLB, | Performed by: FAMILY MEDICINE

## 2023-01-27 PROCEDURE — 3074F SYST BP LT 130 MM HG: CPT | Mod: HCNC,CPTII,S$GLB, | Performed by: FAMILY MEDICINE

## 2023-01-27 PROCEDURE — 1160F PR REVIEW ALL MEDS BY PRESCRIBER/CLIN PHARMACIST DOCUMENTED: ICD-10-PCS | Mod: HCNC,CPTII,S$GLB, | Performed by: FAMILY MEDICINE

## 2023-01-27 PROCEDURE — 99999 PR PBB SHADOW E&M-EST. PATIENT-LVL III: ICD-10-PCS | Mod: PBBFAC,HCNC,, | Performed by: FAMILY MEDICINE

## 2023-01-27 PROCEDURE — 1126F AMNT PAIN NOTED NONE PRSNT: CPT | Mod: HCNC,CPTII,S$GLB, | Performed by: FAMILY MEDICINE

## 2023-01-27 PROCEDURE — 1160F RVW MEDS BY RX/DR IN RCRD: CPT | Mod: HCNC,CPTII,S$GLB, | Performed by: FAMILY MEDICINE

## 2023-01-27 NOTE — ASSESSMENT & PLAN NOTE
Recommend targeting LDL less than 100.  Consider baby aspirin daily.  Continue controlling blood pressure.

## 2023-01-27 NOTE — PATIENT INSTRUCTIONS
Follow up if symptoms worsen or fail to improve, for follow up.     Dear patient,   As a result of recent federal legislation (The Federal Cures Act), you may receive lab or pathology results from your visit in your [facility] account before your physician is able to contact you. Your physician or their representative will relay the results to you with their recommendations at their soonest availability.     If no improvement in symptoms or symptoms worsen, please be advised to call MD, follow-up at clinic and/or go to ER if becomes severe.    Jared Vo M.D.        We Offer TELEHEALTH & Same Day Appointments!   Book your Telehealth appointment with me through my nurse or   Clinic appointments on [facility]!    [address] [place] [address]    Office: [phone]   FAX: [phone]    Check out my Facebook Page and Follow Me at: [email or web address]    Check out my website at [employer] by clicking on: [email or web address]    To Schedule appointments online, go to [email or web address]: [email or web address]

## 2023-01-27 NOTE — ASSESSMENT & PLAN NOTE
Continue CPAP therapy.  Patient is benefitting from the machine.  Patient is 100% compliant.  Discussed sleep apnea condition course.

## 2023-01-27 NOTE — PROGRESS NOTES
PLAN:      Problem List Items Addressed This Visit       MARK on CPAP - Primary (Chronic)     Continue CPAP therapy.  Patient is benefitting from the machine.  Patient is 100% compliant.  Discussed sleep apnea condition course.         Encounter for long-term (current) use of medications (Chronic)     Complete history and physical was completed today.  Complete and thorough medication reconciliation was performed.  Discussed risks and benefits of medications.  Advised patient on orders and health maintenance.  We discussed old records and old labs if available.  Will request any records not available through epic.  Continue current medications listed on your summary sheet.           Class 2 obesity with alveolar hypoventilation, serious comorbidity, and body mass index (BMI) of 36.0 to 36.9 in adult     Continue lifestyle modification with diet and exercise.  Monitoring BMI.         Aortic atherosclerosis     Recommend targeting LDL less than 100.  Consider baby aspirin daily.  Continue controlling blood pressure.          Future Appointments       Date Provider Specialty Appt Notes    3/20/2023  Radiology     3/20/2023  Lab     3/29/2023 Jared Vo MD Family Medicine 6 month f/u, rescheduled from 3/27/23           Medication Management for assessment above:   Medication List with Changes/Refills   Current Medications    AMLODIPINE (NORVASC) 5 MG TABLET    amlodipine 5 mg tablet  TAKE 1 TABLET BY MOUTH EVERY DAY Strength: 5 mg    LOSARTAN-HYDROCHLOROTHIAZIDE 100-25 MG (HYZAAR) 100-25 MG PER TABLET    Take 1 tablet by mouth once daily.    PANTOPRAZOLE (PROTONIX) 20 MG TABLET    Take 1 tablet (20 mg total) by mouth once daily.    VENLAFAXINE (EFFEXOR-XR) 37.5 MG 24 HR CAPSULE    Take 1 capsule (37.5 mg total) by mouth once daily.       Jared Vo M.D.  ==========================================================================  Subjective:   Patient ID: Skinyn Del Cid is a 67 y.o. male.  has a  past medical history of Hypertension, Lung nodule, and MARK (obstructive sleep apnea).   Chief Complaint: cpap paperwork       Problem List Items Addressed This Visit       AMRK on CPAP - Primary (Chronic)    Overview     Jan 2023: Patient has download information to review. Doing well on current machine and mask. He is benefiting from the sleep therapy equipment.     Prev hist 2011, AHI 20, desat to 82%. CPAP 6 cm effective  Home sleep study ([facility]) 9/7/22: AHI 36, desats to 83%)    Home Sleep Studies     Date/Time: 9/9/2022 8:00 AM  Performed by: Yuval Peña MD  Authorized by: Jared Vo MD      PHYSICIAN INTERPRETATION AND COMMENTS: Findings are consistent with severe obstructive sleep apnea (MARK). CPAP  indicated. Please refer to sleep disorders for prompt attention  CLINICAL HISTORY: 67 year old male presented with: 16 inch neck, BMI of 36.6, an Reading sleepiness score of 3, history of  hypertension, a previous diagnosis of MARK and symptoms of nocturnal snoring, waking up choking and witnessed apneas.  Based on the clinical history, the patient has a high pre-test probability of having Severe MARK.  SLEEP STUDY FINDINGS: Patient underwent a 1 night Home Sleep Test and by behavioral criteria, slept for approximately  6.44 hours, with a sleep latency of 1 minutes and a sleep efficiency of 92%. Severe sleep disordered breathing (AHI=36) is  noted based on a 4% hypopnea desaturation criteria. When considering more subtle measures of sleep disordered  breathing, the overall respiratory disturbance index is severe(RDI=48) based on a 1% hypopnea desaturation criteria with  confirmation by surrogate arousal indicators. The apneas/hypopneas are accompanied by minimal oxygen desaturation  (percent time below 90% SpO2: 2.4%, Min SpO2: 83.1%). The average desaturation across all sleep disordered breathing  events is 4.4%. Snoring occurs for 29.2% (30 dB) of the study. The mean pulse rate is 54.7 BPM, with  frequent pulse rate  variability (52 events with >= 6 BPM increase/decrease per hour).  TREATMENT CONSIDERATIONS: Consider nasal continuous positive airway pressure (CPAP/AutoPAP) as the initial  treatment choice for Severe obstructive sleep apnea. A mandibular advancement splint (MAS) or referral to an ENT  surgeon for modification to the airway should be considered to reduce the risk of mortality caused by Severe MARK if the  patient prefers an alternative therapy or the CPAP trial is unsuccessful.         Current Assessment & Plan     Continue CPAP therapy.  Patient is benefitting from the machine.  Patient is 100% compliant.  Discussed sleep apnea condition course.         Encounter for long-term (current) use of medications (Chronic)    Overview     January 2023: Reviewed labs.  November 2022: Reviewed September 2022: Reviewed labs.  CHRONIC. Stable. Compliant with medications for managed conditions. See medication list. No SE reported.   Routine lab analysis is being monitored. Refills were addressed.  Lab Results   Component Value Date    HGB 15.1 02/07/2022         Chemistry        Component Value Date/Time     02/07/2022 0805    K 4.2 02/07/2022 0805     02/07/2022 0805    CO2 28 02/07/2022 0805    BUN 15 02/07/2022 0805    CREATININE 0.8 02/07/2022 0805     02/07/2022 0805        Component Value Date/Time    CALCIUM 10.1 02/07/2022 0805    ALKPHOS 49 (L) 02/07/2022 0805    AST 21 02/07/2022 0805    ALT 28 02/07/2022 0805    BILITOT 0.4 02/07/2022 0805    ESTGFRAFRICA >60.0 02/07/2022 0805    EGFRNONAA >60.0 02/07/2022 0805          No results found for: TSH, E8MTDHO, W7RZKUZ, THYROIDAB, FREET4, T3FREE           Current Assessment & Plan     Complete history and physical was completed today.  Complete and thorough medication reconciliation was performed.  Discussed risks and benefits of medications.  Advised patient on orders and health maintenance.  We discussed old records and old labs  if available.  Will request any records not available through epic.  Continue current medications listed on your summary sheet.           Class 2 obesity with alveolar hypoventilation, serious comorbidity, and body mass index (BMI) of 36.0 to 36.9 in adult    Overview     BMI Readings from Last 10 Encounters:   01/27/23 35.73 kg/m²   11/29/22 35.88 kg/m²   09/19/22 37.68 kg/m²   09/19/22 38.32 kg/m²   08/29/22 36.80 kg/m²   01/31/22 36.92 kg/m²   05/26/20 38.62 kg/m²            Current Assessment & Plan     Continue lifestyle modification with diet and exercise.  Monitoring BMI.         Aortic atherosclerosis    Overview     Chronic.  Seen on imaging previously.  Lab Results   Component Value Date    LDLCALC 109.2 02/07/2022              Current Assessment & Plan     Recommend targeting LDL less than 100.  Consider baby aspirin daily.  Continue controlling blood pressure.             Review of patient's allergies indicates:  No Known Allergies  Current Outpatient Medications   Medication Instructions    amLODIPine (NORVASC) 5 MG tablet amlodipine 5 mg tablet  TAKE 1 TABLET BY MOUTH EVERY DAY Strength: 5 mg    losartan-hydrochlorothiazide 100-25 mg (HYZAAR) 100-25 mg per tablet 1 tablet, Oral, Daily    pantoprazole (PROTONIX) 20 mg, Oral, Daily    venlafaxine (EFFEXOR-XR) 37.5 mg, Oral, Daily      I have reviewed the PMH, social history, FamilyHx, surgical history, allergies and medications documented / confirmed by the patient at the time of this visit.  Review of Systems   Constitutional:  Negative for activity change and unexpected weight change.   HENT:  Negative for hearing loss, rhinorrhea and trouble swallowing.    Eyes:  Negative for discharge and visual disturbance.   Respiratory:  Negative for chest tightness and wheezing.    Cardiovascular:  Negative for chest pain and palpitations.   Gastrointestinal:  Negative for blood in stool, constipation, diarrhea and vomiting.   Endocrine: Negative for polydipsia  "and polyuria.   Genitourinary:  Negative for difficulty urinating, hematuria and urgency.   Musculoskeletal:  Negative for arthralgias, joint swelling and neck pain.   Neurological:  Negative for weakness and headaches.   Psychiatric/Behavioral:  Negative for confusion and dysphoric mood.    Objective:   /74   Pulse 80   Ht 5' 8" (1.727 m)   Wt 106.6 kg (235 lb)   SpO2 96%   BMI 35.73 kg/m²   Physical Exam  Vitals and nursing note reviewed.   Constitutional:       General: He is not in acute distress.     Appearance: He is well-developed. He is not diaphoretic.   HENT:      Head: Normocephalic and atraumatic.      Right Ear: External ear normal.      Left Ear: External ear normal.      Nose: Nose normal. No rhinorrhea.   Eyes:      Extraocular Movements: Extraocular movements intact.      Pupils: Pupils are equal, round, and reactive to light.   Cardiovascular:      Rate and Rhythm: Normal rate.      Pulses: Normal pulses.   Pulmonary:      Effort: Pulmonary effort is normal. No respiratory distress.      Breath sounds: Normal breath sounds.   Abdominal:      General: Bowel sounds are normal.      Palpations: Abdomen is soft.   Musculoskeletal:         General: Normal range of motion.      Cervical back: Normal range of motion and neck supple.   Skin:     General: Skin is warm and dry.      Capillary Refill: Capillary refill takes less than 2 seconds.      Findings: No rash.   Neurological:      General: No focal deficit present.      Mental Status: He is alert and oriented to person, place, and time.      Cranial Nerves: No cranial nerve deficit.      Motor: No weakness.      Gait: Gait normal.   Psychiatric:         Attention and Perception: He is attentive.         Mood and Affect: Mood normal. Mood is not anxious or depressed. Affect is not labile, blunt, angry or inappropriate.         Speech: He is communicative. Speech is not rapid and pressured, delayed, slurred or tangential.         Behavior: " Behavior normal. Behavior is not agitated, slowed, aggressive, withdrawn, hyperactive or combative.         Thought Content: Thought content normal. Thought content is not paranoid or delusional. Thought content does not include homicidal or suicidal ideation. Thought content does not include homicidal or suicidal plan.         Cognition and Memory: Memory is not impaired.         Judgment: Judgment normal. Judgment is not impulsive or inappropriate.       Assessment:     1. MARK on CPAP    2. Encounter for long-term (current) use of medications    3. Aortic atherosclerosis    4. Class 2 obesity with alveolar hypoventilation, serious comorbidity, and body mass index (BMI) of 36.0 to 36.9 in adult      MDM:   Moderate complexity.  Moderate risk.  Total time: 31 minutes.  This includes total time spent on the encounter, which includes face to face time and non-face to face time preparing to see the patient (eg, review of previous medical records, tests), Obtaining and/or reviewing separately obtained history, documenting clinical information in the electronic or other health record, independently interpreting results (not separately reported)/communicating results to the patient/family/caregiver, and/or care coordination (not separately reported).    I have Reviewed and summarized old records.  I have performed thorough medication reconciliation today and discussed risk and benefits of medications.  I have reviewed labs and discussed with patient.  All questions were answered.  I am requesting old records and will review them once they are available.    I have signed for the following orders AND/OR meds.  No orders of the defined types were placed in this encounter.          Follow up if symptoms worsen or fail to improve, for follow up.  Future Appointments       Date Provider Specialty Appt Notes    3/20/2023  Radiology     3/20/2023  Lab     3/29/2023 Jared Vo MD Family Medicine 6 month f/u, rescheduled  from 3/27/23          If no improvement in symptoms or symptoms worsen, advised to call/follow-up at clinic or go to ER. Patient voiced understanding and all questions/concerns were addressed.   DISCLAIMER: This note was compiled by using a speech recognition dictation system and therefore please be aware that typographical / speech recognition errors can and do occur.  Please contact me if you see any errors specifically.    Jared Vo M.D.       Office: [phone] [address] [place] [address]  FAX: [phone]

## 2023-02-09 DIAGNOSIS — Z00.00 ENCOUNTER FOR MEDICARE ANNUAL WELLNESS EXAM: ICD-10-CM

## 2023-03-20 ENCOUNTER — HOSPITAL ENCOUNTER (OUTPATIENT)
Dept: RADIOLOGY | Facility: HOSPITAL | Age: 68
Discharge: HOME | End: 2023-03-20
Attending: FAMILY MEDICINE
Payer: MEDICARE

## 2023-03-20 DIAGNOSIS — K76.89 LIVER CYST: ICD-10-CM

## 2023-03-20 DIAGNOSIS — N28.1 RENAL CYST: ICD-10-CM

## 2023-03-20 PROCEDURE — 76700 US EXAM ABDOM COMPLETE: CPT | Mod: 26,HCNC,, | Performed by: RADIOLOGY

## 2023-03-20 PROCEDURE — 76700 US EXAM ABDOM COMPLETE: CPT | Mod: TC,HCNC,PO

## 2023-03-20 PROCEDURE — 76700 US ABDOMEN COMPLETE: ICD-10-PCS | Mod: 26,HCNC,, | Performed by: RADIOLOGY

## 2023-03-29 ENCOUNTER — OFFICE VISIT (OUTPATIENT)
Dept: FAMILY MEDICINE | Facility: CLINIC | Age: 68
End: 2023-03-29
Payer: MEDICARE

## 2023-03-29 VITALS
BODY MASS INDEX: 36.37 KG/M2 | WEIGHT: 240 LBS | HEIGHT: 68 IN | HEART RATE: 83 BPM | OXYGEN SATURATION: 95 % | SYSTOLIC BLOOD PRESSURE: 135 MMHG | TEMPERATURE: 99 F | DIASTOLIC BLOOD PRESSURE: 89 MMHG | RESPIRATION RATE: 18 BRPM

## 2023-03-29 DIAGNOSIS — K21.00 GASTROESOPHAGEAL REFLUX DISEASE WITH ESOPHAGITIS WITHOUT HEMORRHAGE: ICD-10-CM

## 2023-03-29 DIAGNOSIS — E78.6 LOW HDL (UNDER 40): ICD-10-CM

## 2023-03-29 DIAGNOSIS — I10 HYPERTENSION, ESSENTIAL: Primary | ICD-10-CM

## 2023-03-29 DIAGNOSIS — K76.89 LIVER CYST: Chronic | ICD-10-CM

## 2023-03-29 DIAGNOSIS — N28.1 RENAL CYST: Chronic | ICD-10-CM

## 2023-03-29 DIAGNOSIS — Z79.899 ENCOUNTER FOR LONG-TERM (CURRENT) USE OF MEDICATIONS: ICD-10-CM

## 2023-03-29 DIAGNOSIS — F41.8 ANXIOUS DEPRESSION: ICD-10-CM

## 2023-03-29 PROCEDURE — 1160F RVW MEDS BY RX/DR IN RCRD: CPT | Mod: HCNC,CPTII,S$GLB, | Performed by: FAMILY MEDICINE

## 2023-03-29 PROCEDURE — 3008F BODY MASS INDEX DOCD: CPT | Mod: HCNC,CPTII,S$GLB, | Performed by: FAMILY MEDICINE

## 2023-03-29 PROCEDURE — 3008F PR BODY MASS INDEX (BMI) DOCUMENTED: ICD-10-PCS | Mod: HCNC,CPTII,S$GLB, | Performed by: FAMILY MEDICINE

## 2023-03-29 PROCEDURE — 1159F MED LIST DOCD IN RCRD: CPT | Mod: HCNC,CPTII,S$GLB, | Performed by: FAMILY MEDICINE

## 2023-03-29 PROCEDURE — 3288F FALL RISK ASSESSMENT DOCD: CPT | Mod: HCNC,CPTII,S$GLB, | Performed by: FAMILY MEDICINE

## 2023-03-29 PROCEDURE — 1160F PR REVIEW ALL MEDS BY PRESCRIBER/CLIN PHARMACIST DOCUMENTED: ICD-10-PCS | Mod: HCNC,CPTII,S$GLB, | Performed by: FAMILY MEDICINE

## 2023-03-29 PROCEDURE — 1159F PR MEDICATION LIST DOCUMENTED IN MEDICAL RECORD: ICD-10-PCS | Mod: HCNC,CPTII,S$GLB, | Performed by: FAMILY MEDICINE

## 2023-03-29 PROCEDURE — 1126F PR PAIN SEVERITY QUANTIFIED, NO PAIN PRESENT: ICD-10-PCS | Mod: HCNC,CPTII,S$GLB, | Performed by: FAMILY MEDICINE

## 2023-03-29 PROCEDURE — 3079F PR MOST RECENT DIASTOLIC BLOOD PRESSURE 80-89 MM HG: ICD-10-PCS | Mod: HCNC,CPTII,S$GLB, | Performed by: FAMILY MEDICINE

## 2023-03-29 PROCEDURE — 99999 PR PBB SHADOW E&M-EST. PATIENT-LVL IV: CPT | Mod: PBBFAC,HCNC,, | Performed by: FAMILY MEDICINE

## 2023-03-29 PROCEDURE — 99214 PR OFFICE/OUTPT VISIT, EST, LEVL IV, 30-39 MIN: ICD-10-PCS | Mod: HCNC,S$GLB,, | Performed by: FAMILY MEDICINE

## 2023-03-29 PROCEDURE — 3075F SYST BP GE 130 - 139MM HG: CPT | Mod: HCNC,CPTII,S$GLB, | Performed by: FAMILY MEDICINE

## 2023-03-29 PROCEDURE — 99999 PR PBB SHADOW E&M-EST. PATIENT-LVL IV: ICD-10-PCS | Mod: PBBFAC,HCNC,, | Performed by: FAMILY MEDICINE

## 2023-03-29 PROCEDURE — 99214 OFFICE O/P EST MOD 30 MIN: CPT | Mod: HCNC,S$GLB,, | Performed by: FAMILY MEDICINE

## 2023-03-29 PROCEDURE — 3288F PR FALLS RISK ASSESSMENT DOCUMENTED: ICD-10-PCS | Mod: HCNC,CPTII,S$GLB, | Performed by: FAMILY MEDICINE

## 2023-03-29 PROCEDURE — 3075F PR MOST RECENT SYSTOLIC BLOOD PRESS GE 130-139MM HG: ICD-10-PCS | Mod: HCNC,CPTII,S$GLB, | Performed by: FAMILY MEDICINE

## 2023-03-29 PROCEDURE — 1101F PT FALLS ASSESS-DOCD LE1/YR: CPT | Mod: HCNC,CPTII,S$GLB, | Performed by: FAMILY MEDICINE

## 2023-03-29 PROCEDURE — 3079F DIAST BP 80-89 MM HG: CPT | Mod: HCNC,CPTII,S$GLB, | Performed by: FAMILY MEDICINE

## 2023-03-29 PROCEDURE — 1126F AMNT PAIN NOTED NONE PRSNT: CPT | Mod: HCNC,CPTII,S$GLB, | Performed by: FAMILY MEDICINE

## 2023-03-29 PROCEDURE — 1101F PR PT FALLS ASSESS DOC 0-1 FALLS W/OUT INJ PAST YR: ICD-10-PCS | Mod: HCNC,CPTII,S$GLB, | Performed by: FAMILY MEDICINE

## 2023-03-29 RX ORDER — PANTOPRAZOLE SODIUM 20 MG/1
20 TABLET, DELAYED RELEASE ORAL DAILY
Qty: 90 TABLET | Refills: 3 | Status: SHIPPED | OUTPATIENT
Start: 2023-03-29 | End: 2024-02-16

## 2023-03-29 RX ORDER — VENLAFAXINE HYDROCHLORIDE 37.5 MG/1
37.5 CAPSULE, EXTENDED RELEASE ORAL DAILY
Qty: 90 CAPSULE | Refills: 4 | Status: SHIPPED | OUTPATIENT
Start: 2023-03-29 | End: 2024-03-28

## 2023-03-29 RX ORDER — LOSARTAN POTASSIUM AND HYDROCHLOROTHIAZIDE 25; 100 MG/1; MG/1
1 TABLET ORAL DAILY
Qty: 90 TABLET | Refills: 4 | Status: SHIPPED | OUTPATIENT
Start: 2023-03-29

## 2023-03-29 RX ORDER — AMLODIPINE BESYLATE 5 MG/1
TABLET ORAL
Qty: 90 TABLET | Refills: 4 | Status: SHIPPED | OUTPATIENT
Start: 2023-03-29

## 2023-03-29 NOTE — PATIENT INSTRUCTIONS
Follow up in about 1 year (around 3/29/2024), or if symptoms worsen or fail to improve, for Results, Med refills.     Dear patient,   As a result of recent federal legislation (The Federal Cures Act), you may receive lab or pathology results from your visit in your [facility] account before your physician is able to contact you. Your physician or their representative will relay the results to you with their recommendations at their soonest availability.     If no improvement in symptoms or symptoms worsen, please be advised to call MD, follow-up at clinic and/or go to ER if becomes severe.    Jared Vo M.D.        We Offer TELEHEALTH & Same Day Appointments!   Book your Telehealth appointment with me through my nurse or   Clinic appointments on [employer]!    [address] [place] [address]    Office: [phone]   FAX: [phone]    Check out my Facebook Page and Follow Me at: [email or web address]    Check out my website at [facility] by clicking on: [email or web address]    To Schedule appointments [email or web address] go to [employer]: [email or web address]

## 2023-03-29 NOTE — PROGRESS NOTES
PLAN:      Problem List Items Addressed This Visit       Hypertension, essential - Primary (Chronic)     Counseled on importance of hypertension disease course, I recommend ongoing Education for DASH-diet and exercise.  Counseled on medication regimen importance of treating high blood pressure.  Please be advised of risk of untreated blood pressure as discussed.  Please advised of ER precautions were given for symptoms of hypertensive urgency and emergency.           Relevant Medications    amLODIPine (NORVASC) 5 MG tablet    losartan-hydrochlorothiazide 100-25 mg (HYZAAR) 100-25 mg per tablet    Encounter for long-term (current) use of medications (Chronic)     Complete history and physical was completed today.  Complete and thorough medication reconciliation was performed.  Discussed risks and benefits of medications.  Advised patient on orders and health maintenance.  We discussed old records and old labs if available.  Will request any records not available through epic.  Continue current medications listed on your summary sheet.           Relevant Medications    venlafaxine (EFFEXOR-XR) 37.5 MG 24 hr capsule    pantoprazole (PROTONIX) 20 MG tablet    amLODIPine (NORVASC) 5 MG tablet    losartan-hydrochlorothiazide 100-25 mg (HYZAAR) 100-25 mg per tablet    Renal cyst (Chronic)     Recheck ultrasound in 18 months.         Liver cyst (Chronic)     Repeat ultrasound in 18 months.         Anxious depression     Continue Effexor.Please be advised of condition course.  SNRI/SSRI is first-line treatment for this condition.  Please be advised of the risk of discontinuing this medication without tapering/contacting MD.  Patient has been advised of side effects, and all questions were answered.  Patient voiced understanding.  Patient will follow up routinely and notify us if having any side effects or worsening or persistent symptoms.  ER precautions were given. Antidepressant/Antianxiety Medication Initiation:  Patient  informed of risks, benefits, and potential side effects of medication and accepts informed consent.  Common side effects include nausea, fatigue, headache, insomnia, etc see medication insert for complete side effect profile.  Most importantly be advised of the possibility of new or worsening suicidal thoughts/depression/anxiety etcetera.  Please be advised to stop the medication immediately and seek urgent treatment if this occurs.  Therefore please do not to abruptly discontinue medication without physician guidance except in cases of sudden onset or worsening of SI.            Relevant Medications    venlafaxine (EFFEXOR-XR) 37.5 MG 24 hr capsule    Low HDL (under 40)     Target LDL less than 100.  Patient will try lifestyle modification with diet and exercise.  If no improvement over six months consider starting low-dose statin.Counseled on hyperlipidemia disease course, healthy diet and increased need for exercise.  Please be advised of the risk of cardiovascular disease, increase stroke and heart attack risk with uncontrolled/untreated hyperlipidemia.     Patient voiced understanding and understood the treatment plan. All questions were answered.              Gastroesophageal reflux disease with esophagitis without hemorrhage     GERD RECOMMENDATIONS  Please be advised of condition course.  - Take PPI in the morning 30-60 minutes before breakfast  - I recommend ongoing Education for lifestyle modifications to help control/reduce reflux/abdominal pain including: avoid large meals, avoid eating within 2-3 hours of bedtime (avoid late night eating & lying down soon after eating), elevate head of bed if nocturnal symptoms are present, smoking cessation (if current smoker), & weight loss (if overweight).   - please be advised to avoid known foods which trigger reflux symptoms & to minimize/avoid high-fat foods, chocolate, caffeine, citrus, alcohol, & tomato products.  - Advised to avoid/limit use of NSAID's, since  they can cause GI upset, bleeding, and/or ulcers. If needed, take with food.          Relevant Medications    pantoprazole (PROTONIX) 20 MG tablet          Medication Management for assessment above:   Medication List with Changes/Refills   Changed and/or Refilled Medications    Modified Medication Previous Medication    AMLODIPINE (NORVASC) 5 MG TABLET amLODIPine (NORVASC) 5 MG tablet       amlodipine 5 mg tablet  TAKE 1 TABLET BY MOUTH EVERY DAY Strength: 5 mg    amlodipine 5 mg tablet  TAKE 1 TABLET BY MOUTH EVERY DAY Strength: 5 mg    LOSARTAN-HYDROCHLOROTHIAZIDE 100-25 MG (HYZAAR) 100-25 MG PER TABLET losartan-hydrochlorothiazide 100-25 mg (HYZAAR) 100-25 mg per tablet       Take 1 tablet by mouth once daily.    Take 1 tablet by mouth once daily.    PANTOPRAZOLE (PROTONIX) 20 MG TABLET pantoprazole (PROTONIX) 20 MG tablet       Take 1 tablet (20 mg total) by mouth once daily.    Take 1 tablet (20 mg total) by mouth once daily.    VENLAFAXINE (EFFEXOR-XR) 37.5 MG 24 HR CAPSULE venlafaxine (EFFEXOR-XR) 37.5 MG 24 hr capsule       Take 1 capsule (37.5 mg total) by mouth once daily.    Take 1 capsule (37.5 mg total) by mouth once daily.       Jared Vo M.D.  ==========================================================================  Subjective:   Patient ID: Skinny Del Cid is a 67 y.o. male.  has a past medical history of Hypertension, Lung nodule, MARK (obstructive sleep apnea), and Skin cancer.   Chief Complaint: Follow-up      Problem List Items Addressed This Visit       Hypertension, essential - Primary (Chronic)    Overview     CHRONIC.  March 2023:  Hypertension Medications               amLODIPine (NORVASC) 5 MG tablet amlodipine 5 mg tablet  TAKE 1 TABLET BY MOUTH EVERY DAY Strength: 5 mg    losartan-hydrochlorothiazide 100-25 mg (HYZAAR) 100-25 mg per tablet Take 1 tablet by mouth once daily.     STABLE. BP Reviewed.  Compliant with BP medications. No SE reported.   (-) CP, SOB,  palpitations, dizziness, lightheadedness, HA, arm numbness, tingling or weakness, syncope.  Creatinine   Date Value Ref Range Status   03/20/2023 1.0 0.5 - 1.4 mg/dL Final     Results for orders placed or performed in visit on 01/31/22   IN OFFICE EKG 12-LEAD (to [place])    Collection Time: 01/31/22 11:18 AM    Narrative    Test Reason : Z13.6,    Vent. Rate : 074 BPM     Atrial Rate : 074 BPM     P-R Int : 178 ms          QRS Dur : 094 ms      QT Int : 384 ms       P-R-T Axes : 012 046 017 degrees     QTc Int : 426 ms    Normal sinus rhythm  Normal ECG  No previous ECGs available  Confirmed by MARLO FATIMA, DONNA SONI ([id number]) on 2/1/2022 8:20:16 PM    Referred By: MD MILAN           Confirmed By:DONNA SELLERS MD            Current Assessment & Plan     Counseled on importance of hypertension disease course, I recommend ongoing Education for DASH-diet and exercise.  Counseled on medication regimen importance of treating high blood pressure.  Please be advised of risk of untreated blood pressure as discussed.  Please advised of ER precautions were given for symptoms of hypertensive urgency and emergency.           Encounter for long-term (current) use of medications (Chronic)    Overview     March 2023: Reviewed labs.  January 2023: Reviewed labs.  November 2022: Reviewed September 2022: Reviewed labs.  CHRONIC. Stable. Compliant with medications for managed conditions. See medication list. No SE reported.   Routine lab analysis is being monitored. Refills were addressed.  Lab Results   Component Value Date    HGB 14.3 03/20/2023         Chemistry        Component Value Date/Time     03/20/2023 0833    K 4.6 03/20/2023 0833     03/20/2023 0833    CO2 26 03/20/2023 0833    BUN 14 03/20/2023 0833    CREATININE 1.0 03/20/2023 0833    GLU 97 03/20/2023 0833        Component Value Date/Time    CALCIUM 10.1 02/07/2022 0805    ALKPHOS 49 (L) 02/07/2022 0805    AST 21 02/07/2022 0805    ALT 28 02/07/2022 0805     BILITOT 0.4 02/07/2022 0805    ESTGFRAFRICA >60.0 02/07/2022 0805    EGFRNONAA >60.0 02/07/2022 0805          Lab Results   Component Value Date    TSH 0.943 03/20/2023              Current Assessment & Plan     Complete history and physical was completed today.  Complete and thorough medication reconciliation was performed.  Discussed risks and benefits of medications.  Advised patient on orders and health maintenance.  We discussed old records and old labs if available.  Will request any records not available through epic.  Continue current medications listed on your summary sheet.           Renal cyst (Chronic)    Overview     Simple cyst Seen on abdominal ultrasound.    US Abdomen Complete  Narrative: EXAMINATION:  US ABDOMEN COMPLETE    CLINICAL HISTORY:  Cyst of kidney, acquired    TECHNIQUE:  Complete abdominal ultrasound (including pancreas, aorta, liver, gallbladder, common bile duct, IVC, kidneys, and spleen) was performed.    COMPARISON:  09/16/2022    FINDINGS:  Pancreas: Obscured by overlying bowel gas.    Aorta: Obscured    Gallbladder: The gallbladder is surgically absent.    Biliary system: 9.8 mm common bile duct.  No intrahepatic ductal dilatation.    Liver: 18 cm, homogeneous parenchymal echotexture. Limited visualization of the left lobe with 5.5 cm simple cyst.    Inferior vena cava: Normal in appearance.    Right kidney: 14 cm. Multiple simple cysts.    Left kidney: 14.5 cm. Multiple simple and minimally complex cysts measuring up to 8.4 cm    Spleen: 9 cm.  No intrinsic abnormality.    Miscellaneous: No ascites.  Impression: Limited study secondary to bowel gas.    Bilateral renal cysts, not significantly changed.  Benign left hepatic lobe cyst.    Prominence of the extrahepatic common bile duct likely related to post cholecystectomy state.  Correlate clinically.    Electronically signed by: Luisito Rae MD  Date:    03/20/2023  Time:    09:04             Current Assessment & Plan      Recheck ultrasound in 18 months.         Liver cyst (Chronic)    Overview     Seen on ultrasound of the abdomen.    US Abdomen Complete  Narrative: EXAMINATION:  US ABDOMEN COMPLETE    CLINICAL HISTORY:  Cyst of kidney, acquired    TECHNIQUE:  Complete abdominal ultrasound (including pancreas, aorta, liver, gallbladder, common bile duct, IVC, kidneys, and spleen) was performed.    COMPARISON:  09/16/2022    FINDINGS:  Pancreas: Obscured by overlying bowel gas.    Aorta: Obscured    Gallbladder: The gallbladder is surgically absent.    Biliary system: 9.8 mm common bile duct.  No intrahepatic ductal dilatation.    Liver: 18 cm, homogeneous parenchymal echotexture. Limited visualization of the left lobe with 5.5 cm simple cyst.    Inferior vena cava: Normal in appearance.    Right kidney: 14 cm. Multiple simple cysts.    Left kidney: 14.5 cm. Multiple simple and minimally complex cysts measuring up to 8.4 cm    Spleen: 9 cm.  No intrinsic abnormality.    Miscellaneous: No ascites.  Impression: Limited study secondary to bowel gas.    Bilateral renal cysts, not significantly changed.  Benign left hepatic lobe cyst.    Prominence of the extrahepatic common bile duct likely related to post cholecystectomy state.  Correlate clinically.    Electronically signed by: Luisito Rae MD  Date:    03/20/2023  Time:    09:04             Current Assessment & Plan     Repeat ultrasound in 18 months.         Anxious depression    Overview     March 2023:  Patient doing well on Effexor 37.5 milligrams daily.  Reports compliance.  No side effects reported.  Symptoms are controlled.  Denies any SI HI or hallucinations.    Previous history :New problem.  Patient reports has been having worsening depression over the last few months.  Patient is taking care of his parents at his house which is taxing for his family.  Patient recently retired.  He has decreased interested doing things he enjoys.  Patient has been exercising at [employer].    He gets anxious and worried about his health.  Patient reports declining memory short-term.    Denies SI HI or hallucinations.         Current Assessment & Plan     Continue Effexor.Please be advised of condition course.  SNRI/SSRI is first-line treatment for this condition.  Please be advised of the risk of discontinuing this medication without tapering/contacting MD.  Patient has been advised of side effects, and all questions were answered.  Patient voiced understanding.  Patient will follow up routinely and notify us if having any side effects or worsening or persistent symptoms.  ER precautions were given. Antidepressant/Antianxiety Medication Initiation:  Patient informed of risks, benefits, and potential side effects of medication and accepts informed consent.  Common side effects include nausea, fatigue, headache, insomnia, etc see medication insert for complete side effect profile.  Most importantly be advised of the possibility of new or worsening suicidal thoughts/depression/anxiety etcetera.  Please be advised to stop the medication immediately and seek urgent treatment if this occurs.  Therefore please do not to abruptly discontinue medication without physician guidance except in cases of sudden onset or worsening of SI.            Low HDL (under 40)    Overview     CHRONIC. STABLE. Lab analysis reviewed.   (-) CP, SOB, abdominal pain, N/V/D, constipation, jaundice, skin changes.  (-) Myalgias  Lab Results   Component Value Date    CHOL 167 03/20/2023    CHOL 172 02/07/2022     Lab Results   Component Value Date    HDL 34 (L) 03/20/2023    HDL 37 (L) 02/07/2022     Lab Results   Component Value Date    LDLCALC 107.6 03/20/2023    LDLCALC 109.2 02/07/2022     Lab Results   Component Value Date    TRIG 127 03/20/2023    TRIG 129 02/07/2022     Lab Results   Component Value Date    CHOLHDL 20.4 03/20/2023    CHOLHDL 21.5 02/07/2022     Lab Results   Component Value Date    TOTALCHOLEST 4.9  03/20/2023    TOTALCHOLEST 4.6 02/07/2022     Lab Results   Component Value Date    ALT 22 03/20/2023    AST 21 03/20/2023    ALKPHOS 51 (L) 03/20/2023    BILITOT 0.5 03/20/2023   The 10-year ASCVD risk score (Estela PIRES, et al., 2019) is: 18.9%    Values used to calculate the score:      Age: 67 years      Sex: Male      Is Non- : No      Diabetic: No      Tobacco smoker: No      Systolic Blood Pressure: 135 mmHg      Is BP treated: Yes      HDL Cholesterol: 34 mg/dL      Total Cholesterol: 167 mg/dL           Current Assessment & Plan     Target LDL less than 100.  Patient will try lifestyle modification with diet and exercise.  If no improvement over six months consider starting low-dose statin.Counseled on hyperlipidemia disease course, healthy diet and increased need for exercise.  Please be advised of the risk of cardiovascular disease, increase stroke and heart attack risk with uncontrolled/untreated hyperlipidemia.     Patient voiced understanding and understood the treatment plan. All questions were answered.              Gastroesophageal reflux disease with esophagitis without hemorrhage    Overview     Chronic.  Stable.  Reports compliance with pantoprazole 20 milligrams daily.  No side effects reported.  Symptoms are controlled.         Current Assessment & Plan     GERD RECOMMENDATIONS  Please be advised of condition course.  - Take PPI in the morning 30-60 minutes before breakfast  - I recommend ongoing Education for lifestyle modifications to help control/reduce reflux/abdominal pain including: avoid large meals, avoid eating within 2-3 hours of bedtime (avoid late night eating & lying down soon after eating), elevate head of bed if nocturnal symptoms are present, smoking cessation (if current smoker), & weight loss (if overweight).   - please be advised to avoid known foods which trigger reflux symptoms & to minimize/avoid high-fat foods, chocolate, caffeine, citrus, alcohol, &  "tomato products.  - Advised to avoid/limit use of NSAID's, since they can cause GI upset, bleeding, and/or ulcers. If needed, take with food.              Review of patient's allergies indicates:  No Known Allergies  Current Outpatient Medications   Medication Instructions    amLODIPine (NORVASC) 5 MG tablet amlodipine 5 mg tablet  TAKE 1 TABLET BY MOUTH EVERY DAY Strength: 5 mg    losartan-hydrochlorothiazide 100-25 mg (HYZAAR) 100-25 mg per tablet 1 tablet, Oral, Daily    pantoprazole (PROTONIX) 20 mg, Oral, Daily    venlafaxine (EFFEXOR-XR) 37.5 mg, Oral, Daily      I have reviewed the PMH, social history, FamilyHx, surgical history, allergies and medications documented / confirmed by the patient at the time of this visit.  Review of Systems   Constitutional:  Negative for activity change and unexpected weight change.   HENT:  Negative for hearing loss, rhinorrhea and trouble swallowing.    Eyes:  Negative for discharge and visual disturbance.   Respiratory:  Negative for chest tightness and wheezing.    Cardiovascular:  Negative for chest pain and palpitations.   Gastrointestinal:  Negative for blood in stool, constipation, diarrhea and vomiting.   Endocrine: Negative for polydipsia and polyuria.   Genitourinary:  Negative for difficulty urinating, hematuria and urgency.   Musculoskeletal:  Negative for arthralgias, joint swelling and neck pain.   Neurological:  Negative for weakness and headaches.   Psychiatric/Behavioral:  Negative for confusion and dysphoric mood.    Objective:   /89   Pulse 83   Temp 98.6 °F (37 °C) (Temporal)   Resp 18   Ht 5' 8" (1.727 m)   Wt 108.9 kg (240 lb)   SpO2 95%   BMI 36.49 kg/m²   Physical Exam  Vitals and nursing note reviewed.   Constitutional:       General: He is not in acute distress.     Appearance: He is well-developed. He is not diaphoretic.   HENT:      Head: Normocephalic and atraumatic.      Right Ear: External ear normal.      Left Ear: External ear " normal.      Nose: Nose normal. No rhinorrhea.   Eyes:      Extraocular Movements: Extraocular movements intact.      Pupils: Pupils are equal, round, and reactive to light.   Cardiovascular:      Rate and Rhythm: Normal rate.      Pulses: Normal pulses.   Pulmonary:      Effort: Pulmonary effort is normal. No respiratory distress.      Breath sounds: Normal breath sounds.   Abdominal:      General: Bowel sounds are normal.      Palpations: Abdomen is soft.   Musculoskeletal:         General: Normal range of motion.      Cervical back: Normal range of motion and neck supple.   Skin:     General: Skin is warm and dry.      Capillary Refill: Capillary refill takes less than 2 seconds.      Findings: No rash.   Neurological:      General: No focal deficit present.      Mental Status: He is alert and oriented to person, place, and time.      Cranial Nerves: No cranial nerve deficit.      Motor: No weakness.      Gait: Gait normal.   Psychiatric:         Attention and Perception: He is attentive.         Mood and Affect: Mood normal. Mood is not anxious or depressed. Affect is not labile, blunt, angry or inappropriate.         Speech: He is communicative. Speech is not rapid and pressured, delayed, slurred or tangential.         Behavior: Behavior normal. Behavior is not agitated, slowed, aggressive, withdrawn, hyperactive or combative.         Thought Content: Thought content normal. Thought content is not paranoid or delusional. Thought content does not include homicidal or suicidal ideation. Thought content does not include homicidal or suicidal plan.         Cognition and Memory: Memory is not impaired.         Judgment: Judgment normal. Judgment is not impulsive or inappropriate.       Assessment:     1. Hypertension, essential    2. Anxious depression    3. Encounter for long-term (current) use of medications    4. Low HDL (under 40)    5. Gastroesophageal reflux disease with esophagitis without hemorrhage    6.  Renal cyst    7. Liver cyst      MDM:   Moderate complexity.  Moderate risk.  Total time: 32 minutes.  This includes total time spent on the encounter, which includes face to face time and non-face to face time preparing to see the patient (eg, review of previous medical records, tests), Obtaining and/or reviewing separately obtained history, documenting clinical information in the electronic or other health record, independently interpreting results (not separately reported)/communicating results to the patient/family/caregiver, and/or care coordination (not separately reported).    I have Reviewed and summarized old records.  I have performed thorough medication reconciliation today and discussed risk and benefits of medications.  I have reviewed US, labs and discussed with patient.  All questions were answered.      I have signed for the following orders AND/OR meds.  No orders of the defined types were placed in this encounter.    Medications Ordered This Encounter   Medications    amLODIPine (NORVASC) 5 MG tablet     Sig: amlodipine 5 mg tablet  TAKE 1 TABLET BY MOUTH EVERY DAY Strength: 5 mg     Dispense:  90 tablet     Refill:  4     .    losartan-hydrochlorothiazide 100-25 mg (HYZAAR) 100-25 mg per tablet     Sig: Take 1 tablet by mouth once daily.     Dispense:  90 tablet     Refill:  4     .    pantoprazole (PROTONIX) 20 MG tablet     Sig: Take 1 tablet (20 mg total) by mouth once daily.     Dispense:  90 tablet     Refill:  3    venlafaxine (EFFEXOR-XR) 37.5 MG 24 hr capsule     Sig: Take 1 capsule (37.5 mg total) by mouth once daily.     Dispense:  90 capsule     Refill:  4        Follow up in about 1 year (around 3/29/2024), or if symptoms worsen or fail to improve, for Results, Med refills.      If no improvement in symptoms or symptoms worsen, advised to call/follow-up at clinic or go to ER. Patient voiced understanding and all questions/concerns were addressed.   DISCLAIMER: This note was compiled by  using a speech recognition dictation system and therefore please be aware that typographical / speech recognition errors can and do occur.  Please contact me if you see any errors specifically.    Jared Vo M.D.       Office: [phone] [id number] [place] [address]  FAX: [phone]

## 2023-03-30 NOTE — ASSESSMENT & PLAN NOTE
Target LDL less than 100.  Patient will try lifestyle modification with diet and exercise.  If no improvement over six months consider starting low-dose statin.Counseled on hyperlipidemia disease course, healthy diet and increased need for exercise.  Please be advised of the risk of cardiovascular disease, increase stroke and heart attack risk with uncontrolled/untreated hyperlipidemia.     Patient voiced understanding and understood the treatment plan. All questions were answered.

## 2023-03-30 NOTE — ASSESSMENT & PLAN NOTE
Continue Effexor.Please be advised of condition course.  SNRI/SSRI is first-line treatment for this condition.  Please be advised of the risk of discontinuing this medication without tapering/contacting MD.  Patient has been advised of side effects, and all questions were answered.  Patient voiced understanding.  Patient will follow up routinely and notify us if having any side effects or worsening or persistent symptoms.  ER precautions were given. Antidepressant/Antianxiety Medication Initiation:  Patient informed of risks, benefits, and potential side effects of medication and accepts informed consent.  Common side effects include nausea, fatigue, headache, insomnia, etc see medication insert for complete side effect profile.  Most importantly be advised of the possibility of new or worsening suicidal thoughts/depression/anxiety etcetera.  Please be advised to stop the medication immediately and seek urgent treatment if this occurs.  Therefore please do not to abruptly discontinue medication without physician guidance except in cases of sudden onset or worsening of SI.

## 2023-05-12 ENCOUNTER — PES CALL (OUTPATIENT)
Dept: ADMINISTRATIVE | Facility: CLINIC | Age: 68
End: 2023-05-12
Payer: MEDICARE

## 2023-06-16 ENCOUNTER — PES CALL (OUTPATIENT)
Dept: ADMINISTRATIVE | Facility: CLINIC | Age: 68
End: 2023-06-16
Payer: MEDICARE

## 2023-06-27 ENCOUNTER — TELEPHONE (OUTPATIENT)
Dept: ADMINISTRATIVE | Facility: CLINIC | Age: 68
End: 2023-06-27
Payer: MEDICARE

## 2023-06-29 ENCOUNTER — OFFICE VISIT (OUTPATIENT)
Dept: FAMILY MEDICINE | Facility: CLINIC | Age: 68
End: 2023-06-29
Payer: MEDICARE

## 2023-06-29 VITALS
DIASTOLIC BLOOD PRESSURE: 85 MMHG | SYSTOLIC BLOOD PRESSURE: 137 MMHG | WEIGHT: 239 LBS | HEART RATE: 83 BPM | HEIGHT: 68 IN | BODY MASS INDEX: 36.22 KG/M2 | OXYGEN SATURATION: 98 %

## 2023-06-29 DIAGNOSIS — E78.00 HYPERCHOLESTEROLEMIA: ICD-10-CM

## 2023-06-29 DIAGNOSIS — H61.23 BILATERAL IMPACTED CERUMEN: ICD-10-CM

## 2023-06-29 DIAGNOSIS — E66.2 CLASS 2 OBESITY WITH ALVEOLAR HYPOVENTILATION, SERIOUS COMORBIDITY, AND BODY MASS INDEX (BMI) OF 36.0 TO 36.9 IN ADULT: ICD-10-CM

## 2023-06-29 DIAGNOSIS — K76.0 HEPATIC STEATOSIS: ICD-10-CM

## 2023-06-29 DIAGNOSIS — Z00.00 ENCOUNTER FOR MEDICARE ANNUAL WELLNESS EXAM: Primary | ICD-10-CM

## 2023-06-29 DIAGNOSIS — I10 HYPERTENSION, ESSENTIAL: Chronic | ICD-10-CM

## 2023-06-29 DIAGNOSIS — R91.1 PULMONARY NODULE: Chronic | ICD-10-CM

## 2023-06-29 DIAGNOSIS — R41.3 MEMORY LOSS: ICD-10-CM

## 2023-06-29 DIAGNOSIS — I70.0 AORTIC ATHEROSCLEROSIS: ICD-10-CM

## 2023-06-29 DIAGNOSIS — G47.33 OSA ON CPAP: Chronic | ICD-10-CM

## 2023-06-29 DIAGNOSIS — N28.1 RENAL CYST: Chronic | ICD-10-CM

## 2023-06-29 DIAGNOSIS — K76.89 LIVER CYST: Chronic | ICD-10-CM

## 2023-06-29 DIAGNOSIS — K21.00 GASTROESOPHAGEAL REFLUX DISEASE WITH ESOPHAGITIS WITHOUT HEMORRHAGE: ICD-10-CM

## 2023-06-29 DIAGNOSIS — F41.8 ANXIOUS DEPRESSION: ICD-10-CM

## 2023-06-29 DIAGNOSIS — H91.93 BILATERAL HEARING LOSS, UNSPECIFIED HEARING LOSS TYPE: ICD-10-CM

## 2023-06-29 PROCEDURE — 99999 PR PBB SHADOW E&M-EST. PATIENT-LVL IV: ICD-10-PCS | Mod: PBBFAC,,, | Performed by: PHYSICIAN ASSISTANT

## 2023-06-29 PROCEDURE — 3288F PR FALLS RISK ASSESSMENT DOCUMENTED: ICD-10-PCS | Mod: CPTII,S$GLB,, | Performed by: PHYSICIAN ASSISTANT

## 2023-06-29 PROCEDURE — 1101F PT FALLS ASSESS-DOCD LE1/YR: CPT | Mod: CPTII,S$GLB,, | Performed by: PHYSICIAN ASSISTANT

## 2023-06-29 PROCEDURE — 1159F PR MEDICATION LIST DOCUMENTED IN MEDICAL RECORD: ICD-10-PCS | Mod: CPTII,S$GLB,, | Performed by: PHYSICIAN ASSISTANT

## 2023-06-29 PROCEDURE — 1160F PR REVIEW ALL MEDS BY PRESCRIBER/CLIN PHARMACIST DOCUMENTED: ICD-10-PCS | Mod: CPTII,S$GLB,, | Performed by: PHYSICIAN ASSISTANT

## 2023-06-29 PROCEDURE — 3075F SYST BP GE 130 - 139MM HG: CPT | Mod: CPTII,S$GLB,, | Performed by: PHYSICIAN ASSISTANT

## 2023-06-29 PROCEDURE — G0439 PR MEDICARE ANNUAL WELLNESS SUBSEQUENT VISIT: ICD-10-PCS | Mod: S$GLB,,, | Performed by: PHYSICIAN ASSISTANT

## 2023-06-29 PROCEDURE — 99999 PR PBB SHADOW E&M-EST. PATIENT-LVL IV: CPT | Mod: PBBFAC,,, | Performed by: PHYSICIAN ASSISTANT

## 2023-06-29 PROCEDURE — 3075F PR MOST RECENT SYSTOLIC BLOOD PRESS GE 130-139MM HG: ICD-10-PCS | Mod: CPTII,S$GLB,, | Performed by: PHYSICIAN ASSISTANT

## 2023-06-29 PROCEDURE — 1101F PR PT FALLS ASSESS DOC 0-1 FALLS W/OUT INJ PAST YR: ICD-10-PCS | Mod: CPTII,S$GLB,, | Performed by: PHYSICIAN ASSISTANT

## 2023-06-29 PROCEDURE — 1170F FXNL STATUS ASSESSED: CPT | Mod: CPTII,S$GLB,, | Performed by: PHYSICIAN ASSISTANT

## 2023-06-29 PROCEDURE — 3079F DIAST BP 80-89 MM HG: CPT | Mod: CPTII,S$GLB,, | Performed by: PHYSICIAN ASSISTANT

## 2023-06-29 PROCEDURE — 1159F MED LIST DOCD IN RCRD: CPT | Mod: CPTII,S$GLB,, | Performed by: PHYSICIAN ASSISTANT

## 2023-06-29 PROCEDURE — 3288F FALL RISK ASSESSMENT DOCD: CPT | Mod: CPTII,S$GLB,, | Performed by: PHYSICIAN ASSISTANT

## 2023-06-29 PROCEDURE — 3079F PR MOST RECENT DIASTOLIC BLOOD PRESSURE 80-89 MM HG: ICD-10-PCS | Mod: CPTII,S$GLB,, | Performed by: PHYSICIAN ASSISTANT

## 2023-06-29 PROCEDURE — 3008F PR BODY MASS INDEX (BMI) DOCUMENTED: ICD-10-PCS | Mod: CPTII,S$GLB,, | Performed by: PHYSICIAN ASSISTANT

## 2023-06-29 PROCEDURE — 1160F RVW MEDS BY RX/DR IN RCRD: CPT | Mod: CPTII,S$GLB,, | Performed by: PHYSICIAN ASSISTANT

## 2023-06-29 PROCEDURE — 3008F BODY MASS INDEX DOCD: CPT | Mod: CPTII,S$GLB,, | Performed by: PHYSICIAN ASSISTANT

## 2023-06-29 PROCEDURE — 1170F PR FUNCTIONAL STATUS ASSESSED: ICD-10-PCS | Mod: CPTII,S$GLB,, | Performed by: PHYSICIAN ASSISTANT

## 2023-06-29 PROCEDURE — G0439 PPPS, SUBSEQ VISIT: HCPCS | Mod: S$GLB,,, | Performed by: PHYSICIAN ASSISTANT

## 2023-06-29 NOTE — PROGRESS NOTES
"Skinny Del Cid presented for a  Medicare AWV and comprehensive Health Risk Assessment today. The following components were reviewed and updated:    Medical history  Family History  Social history  Allergies and Current Medications  Health Risk Assessment  Health Maintenance  Care Team         ** See Completed Assessments for Annual Wellness Visit within the encounter summary.**         The following assessments were completed:  Living Situation  CAGE  Depression Screening  Timed Get Up and Go  Whisper Test  Cognitive Function Screening  Nutrition Screening  ADL Screening  PAQ Screening        Vitals:    06/29/23 1100   BP: 137/85   Pulse: 83   SpO2: 98%   Weight: 108.4 kg (239 lb)   Height: 5' 8" (1.727 m)     Body mass index is 36.34 kg/m².  Physical Exam  Constitutional:       General: He is not in acute distress.     Appearance: Normal appearance. He is well-developed.   HENT:      Head: Normocephalic and atraumatic.      Right Ear: There is impacted cerumen.      Left Ear: There is impacted cerumen.      Nose: Nose normal.      Mouth/Throat:      Mouth: Mucous membranes are moist.      Pharynx: Oropharynx is clear.   Eyes:      Conjunctiva/sclera: Conjunctivae normal.   Cardiovascular:      Rate and Rhythm: Normal rate and regular rhythm.      Pulses: Normal pulses.      Heart sounds: Normal heart sounds. No murmur heard.  Pulmonary:      Effort: Pulmonary effort is normal. No respiratory distress.      Breath sounds: Normal breath sounds.   Abdominal:      General: Bowel sounds are normal. There is no distension.      Palpations: Abdomen is soft.      Tenderness: There is no abdominal tenderness.   Musculoskeletal:         General: Normal range of motion.      Cervical back: Normal range of motion and neck supple.   Skin:     General: Skin is warm and dry.      Findings: No rash.   Neurological:      General: No focal deficit present.      Mental Status: He is alert and oriented to person, place, and time. "   Psychiatric:         Mood and Affect: Mood normal.         Behavior: Behavior normal.             Diagnoses and health risks identified today and associated recommendations/orders:    1. Encounter for Medicare annual wellness exam  Complete history and physical was completed today. Complete and thorough medication reconciliation was performed. Discussed risks and benefits of medications. Advised patient on orders and health maintenance. Continue current medications listed on your summary sheet. Discussed getting shingles and tetanus vaccine at pharmacy.    - Ambulatory Referral/Consult to Enhanced Annual Wellness Visit (eAWV)    2. Memory loss  Chronic. Stable  Continue current medications and plan of care per PCP and specialists     3. Anxious depression  Chronic. Stable  Remains on Effexor  Continue current medications and plan of care per PCP and specialists     4. Bilateral impacted cerumen  Chronic. Stable  Previously established with ENT; will send new referral   Continue current medications and plan of care per PCP and specialists     - Ambulatory referral/consult to ENT; Future    5. Bilateral hearing loss, unspecified hearing loss type  Chronic. Stable  Likely secondary to cerumen impaction  Previously established with ENT; will send new referral   Continue current medications and plan of care per PCP and specialists     - Ambulatory referral/consult to ENT; Future    6. Pulmonary nodule  Chronic. Stable  Continue current medications and plan of care per PCP and specialists     7. Hypertension, essential  Chronic. Stable  BP at goal today  Continue current medications and plan of care per PCP and specialists   Hypertension Medications               amLODIPine (NORVASC) 5 MG tablet amlodipine 5 mg tablet  TAKE 1 TABLET BY MOUTH EVERY DAY Strength: 5 mg    losartan-hydrochlorothiazide 100-25 mg (HYZAAR) 100-25 mg per tablet Take 1 tablet by mouth once daily.     8. Hypercholesterolemia  Chronic.  Stable  Continue current medications and plan of care per PCP and specialists   Most recent labs listed below:  Lab Results   Component Value Date    CHOL 167 03/20/2023     Lab Results   Component Value Date    HDL 34 (L) 03/20/2023     Lab Results   Component Value Date    LDLCALC 107.6 03/20/2023     Lab Results   Component Value Date    TRIG 127 03/20/2023     Lab Results   Component Value Date    ALT 22 03/20/2023    AST 21 03/20/2023    ALKPHOS 51 (L) 03/20/2023    BILITOT 0.5 03/20/2023     9. Aortic atherosclerosis  Chronic. Stable  Continue current medications and plan of care per PCP and specialists     10. Renal cyst  Chronic. Stable  Noted on abdominal US  Continue current medications and plan of care per PCP and specialists     11. Liver cyst  Chronic. Stable  Noted on abdominal US   Continue current medications and plan of care per PCP and specialists     12. Hepatic steatosis  Chronic. Stable  Noted on abdominal US   Continue current medications and plan of care per PCP and specialists     13. Gastroesophageal reflux disease with esophagitis without hemorrhage  Chronic. Stable  Remains on daily PPI  Continue lifestyle modification with avoidance of acidic foods, caffeine, late night eating  Continue current medications and plan of care per PCP and specialists     14. MARK on CPAP  Chronic. Stable  Compliant with CPAP  Continue current medications and plan of care per PCP and specialists     15. Class 2 obesity with alveolar hypoventilation, serious comorbidity, and body mass index (BMI) of 36.0 to 36.9 in adult  Chronic. Stable  General weight loss/lifestyle modification strategies discussed (elicit support from others; identify saboteurs; non-food rewards, etc).  Informal exercise measures discussed, e.g. taking stairs instead of elevator.  Regular aerobic exercise program discussed.  Continue current medications and plan of care per PCP and specialists       I offered to discuss end of life issues,  including information on how to make advance directives that the patient could use to name someone who would make medical decisions on their behalf if they became too ill to make themselves.    ___Patient declined - already done.    _x__Patient is interested, I provided paperwork and offered to discuss.      Provided Skinny with a 5-10 year written screening schedule and personal prevention plan. Recommendations were developed using the USPSTF age appropriate recommendations. Education, counseling, and referrals were provided as needed. After Visit Summary printed and given to patient which includes a list of additional screenings\tests needed.    Follow up for with PCP.    Meg Anguiano PA-C

## 2023-07-07 ENCOUNTER — PATIENT MESSAGE (OUTPATIENT)
Dept: INFECTIOUS DISEASES | Facility: CLINIC | Age: 68
End: 2023-07-07
Payer: MEDICARE

## 2023-12-05 ENCOUNTER — PATIENT MESSAGE (OUTPATIENT)
Dept: FAMILY MEDICINE | Facility: CLINIC | Age: 68
End: 2023-12-05
Payer: MEDICARE

## 2024-02-15 DIAGNOSIS — Z79.899 ENCOUNTER FOR LONG-TERM (CURRENT) USE OF MEDICATIONS: ICD-10-CM

## 2024-02-15 DIAGNOSIS — K21.00 GASTROESOPHAGEAL REFLUX DISEASE WITH ESOPHAGITIS WITHOUT HEMORRHAGE: ICD-10-CM

## 2024-02-15 NOTE — TELEPHONE ENCOUNTER
Care Due:                  Date            Visit Type   Department     Provider  --------------------------------------------------------------------------------                                EP -                              PRIMARY      [facility]  Last Visit: 03-      CARE ([facility])   MEDICINE       Jared GREENE                              ANNUAL                              CHECKUP/PHY  [facility]  Next Visit: 03-      S            MEDICINE       Jared Vo                                                            Last  Test          Frequency    Reason                     Performed    Due Date  --------------------------------------------------------------------------------    CMP.........  12 months..  losartan-hydrochlorothiaz  03-   03-                             michelle, venlafaxine.........    [employer] Embedded Care Due Messages. Reference number: [id number].   2/15/2024 2:20:03 PM CST

## 2024-02-16 RX ORDER — PANTOPRAZOLE SODIUM 20 MG/1
20 TABLET, DELAYED RELEASE ORAL
Qty: 90 TABLET | Refills: 0 | Status: SHIPPED | OUTPATIENT
Start: 2024-02-16 | End: 2024-04-12 | Stop reason: SDUPTHER

## 2024-02-16 NOTE — TELEPHONE ENCOUNTER
Provider Staff:  Action required for this patient    Requires labs      Please see care gap opportunities below in Care Due Message.    Thanks!  [facility]     Appointments      Date Provider   Last Visit   3/29/2023 Jared Vo MD   Next Visit   3/28/2024 Jared Vo MD     Refill Decision Note   Skinny Del Cid  is requesting a refill authorization.  Brief Assessment and Rationale for Refill:  Approve     Medication Therapy Plan:        Comments:     Note composed:9:36 AM 02/16/2024

## 2024-04-12 ENCOUNTER — OFFICE VISIT (OUTPATIENT)
Dept: FAMILY MEDICINE | Facility: CLINIC | Age: 69
End: 2024-04-12
Payer: MEDICARE

## 2024-04-12 VITALS
DIASTOLIC BLOOD PRESSURE: 84 MMHG | SYSTOLIC BLOOD PRESSURE: 126 MMHG | WEIGHT: 247.88 LBS | HEART RATE: 75 BPM | HEIGHT: 68 IN | OXYGEN SATURATION: 96 % | BODY MASS INDEX: 37.57 KG/M2

## 2024-04-12 DIAGNOSIS — Z79.899 ENCOUNTER FOR LONG-TERM (CURRENT) USE OF MEDICATIONS: ICD-10-CM

## 2024-04-12 DIAGNOSIS — F41.8 ANXIOUS DEPRESSION: ICD-10-CM

## 2024-04-12 DIAGNOSIS — Z23 NEED FOR PNEUMOCOCCAL VACCINE: ICD-10-CM

## 2024-04-12 DIAGNOSIS — I10 HYPERTENSION, ESSENTIAL: Primary | ICD-10-CM

## 2024-04-12 DIAGNOSIS — R91.1 PULMONARY NODULE: Chronic | ICD-10-CM

## 2024-04-12 DIAGNOSIS — K21.00 GASTROESOPHAGEAL REFLUX DISEASE WITH ESOPHAGITIS WITHOUT HEMORRHAGE: ICD-10-CM

## 2024-04-12 PROBLEM — E78.00 HYPERCHOLESTEROLEMIA: Status: RESOLVED | Noted: 2022-01-31 | Resolved: 2024-04-12

## 2024-04-12 PROCEDURE — 1159F MED LIST DOCD IN RCRD: CPT | Mod: CPTII,S$GLB,, | Performed by: FAMILY MEDICINE

## 2024-04-12 PROCEDURE — 99999 PR PBB SHADOW E&M-EST. PATIENT-LVL IV: CPT | Mod: PBBFAC,,, | Performed by: FAMILY MEDICINE

## 2024-04-12 PROCEDURE — G0009 ADMIN PNEUMOCOCCAL VACCINE: HCPCS | Mod: S$GLB,,, | Performed by: FAMILY MEDICINE

## 2024-04-12 PROCEDURE — 3074F SYST BP LT 130 MM HG: CPT | Mod: CPTII,S$GLB,, | Performed by: FAMILY MEDICINE

## 2024-04-12 PROCEDURE — 3008F BODY MASS INDEX DOCD: CPT | Mod: CPTII,S$GLB,, | Performed by: FAMILY MEDICINE

## 2024-04-12 PROCEDURE — 90677 PCV20 VACCINE IM: CPT | Mod: S$GLB,,, | Performed by: FAMILY MEDICINE

## 2024-04-12 PROCEDURE — 3079F DIAST BP 80-89 MM HG: CPT | Mod: CPTII,S$GLB,, | Performed by: FAMILY MEDICINE

## 2024-04-12 PROCEDURE — 99214 OFFICE O/P EST MOD 30 MIN: CPT | Mod: 25,S$GLB,, | Performed by: FAMILY MEDICINE

## 2024-04-12 PROCEDURE — 3288F FALL RISK ASSESSMENT DOCD: CPT | Mod: CPTII,S$GLB,, | Performed by: FAMILY MEDICINE

## 2024-04-12 PROCEDURE — 1126F AMNT PAIN NOTED NONE PRSNT: CPT | Mod: CPTII,S$GLB,, | Performed by: FAMILY MEDICINE

## 2024-04-12 PROCEDURE — 1101F PT FALLS ASSESS-DOCD LE1/YR: CPT | Mod: CPTII,S$GLB,, | Performed by: FAMILY MEDICINE

## 2024-04-12 RX ORDER — PANTOPRAZOLE SODIUM 20 MG/1
20 TABLET, DELAYED RELEASE ORAL DAILY
Qty: 90 TABLET | Refills: 4 | Status: SHIPPED | OUTPATIENT
Start: 2024-04-12

## 2024-04-12 RX ORDER — LOSARTAN POTASSIUM AND HYDROCHLOROTHIAZIDE 25; 100 MG/1; MG/1
1 TABLET ORAL DAILY
Qty: 90 TABLET | Refills: 4 | Status: SHIPPED | OUTPATIENT
Start: 2024-04-12

## 2024-04-12 RX ORDER — VENLAFAXINE HYDROCHLORIDE 37.5 MG/1
37.5 CAPSULE, EXTENDED RELEASE ORAL DAILY
Qty: 90 CAPSULE | Refills: 4 | Status: SHIPPED | OUTPATIENT
Start: 2024-04-12 | End: 2025-04-12

## 2024-04-12 RX ORDER — AMLODIPINE BESYLATE 5 MG/1
TABLET ORAL
Qty: 90 TABLET | Refills: 4 | Status: SHIPPED | OUTPATIENT
Start: 2024-04-12

## 2024-04-12 NOTE — PATIENT INSTRUCTIONS
Follow up in about 1 year (around 4/12/2025), or if symptoms worsen or fail to improve, for Annual Wellness Exam.     Dear patient,   As a result of recent federal legislation (The Federal Cures Act), you may receive lab or pathology results from your visit in your [facility] account before your physician is able to contact you. Your physician or their representative will relay the results to you with their recommendations at their soonest availability.     If no improvement in symptoms or symptoms worsen, please be advised to call MD, follow-up at clinic and/or go to ER if becomes severe.    Jared Vo M.D.        We Offer TELEHEALTH & Same Day Appointments!   Book your Telehealth appointment with me through my nurse or   Clinic appointments on [facility]!    [address] [place] [address]    Office: [phone]   FAX: [phone]    Check out my Facebook Page and Follow Me at: [email or web address]    Check [email or web address] my website at [employer] by clicking on: [email or web address]    [email or web address] Schedule appointments online, go to [facility]: [email or web address]

## 2024-04-12 NOTE — PROGRESS NOTES
PLAN:    Assessment & Plan  1. Annual wellness.  The patient's blood pressure is well-regulated. The patient will receive his pneumonia vaccine today. Additionally, he will receive the PCV 20 vaccine, which covers 20 different strains of bacterial pneumonia. He is advised to obtain the shingles and tetanus vaccines from the retail pharmacy. His current medications have been refilled. He is scheduled for laboratory tests on Tuesday.    2. Incidental pulmonary nodule.  A repeat CT scan of the lung will be conducted. Should the pulmonary nodule be found to be grow, appropriate treatment will be initiated.    Problem List Items Addressed This Visit       Pulmonary nodule (Chronic)     Update CT of the chest.  Follow-up with Pulmonary.         Relevant Orders    CT Chest Without Contrast    Hypertension, essential - Primary (Chronic)     Counseled on importance of hypertension disease course, I recommend ongoing Education for DASH-diet and exercise.  Counseled on medication regimen importance of treating high blood pressure.  Please be advised of risk of untreated blood pressure as discussed.  Please advised of ER precautions were given for symptoms of hypertensive urgency and emergency.           Relevant Medications    amLODIPine (NORVASC) 5 MG tablet    losartan-hydrochlorothiazide 100-25 mg (HYZAAR) 100-25 mg per tablet    Encounter for long-term (current) use of medications (Chronic)     Complete history and physical was completed today.  Complete and thorough medication reconciliation was performed.  Discussed risks and benefits of medications.  Advised patient on orders and health maintenance.  We discussed old records and old labs if available.  Will request any records not available through epic.  Continue current medications listed on your summary sheet.           Relevant Medications    amLODIPine (NORVASC) 5 MG tablet    losartan-hydrochlorothiazide 100-25 mg (HYZAAR) 100-25 mg per tablet    pantoprazole  (PROTONIX) 20 MG tablet    venlafaxine (EFFEXOR-XR) 37.5 MG 24 hr capsule    Other Relevant Orders    CBC W/ AUTO DIFFERENTIAL    HEMOGLOBIN A1C    Anxious depression     Continue Effexor.Please be advised of condition course.  SNRI/SSRI is first-line treatment for this condition.  Please be advised of the risk of discontinuing this medication without tapering/contacting MD.  Patient has been advised of side effects, and all questions were answered.  Patient voiced understanding.  Patient will follow up routinely and notify us if having any side effects or worsening or persistent symptoms.  ER precautions were given. Antidepressant/Antianxiety Medication Initiation:  Patient informed of risks, benefits, and potential side effects of medication and accepts informed consent.  Common side effects include nausea, fatigue, headache, insomnia, etc see medication insert for complete side effect profile.  Most importantly be advised of the possibility of new or worsening suicidal thoughts/depression/anxiety etcetera.  Please be advised to stop the medication immediately and seek urgent treatment if this occurs.  Therefore please do not to abruptly discontinue medication without physician guidance except in cases of sudden onset or worsening of SI.            Relevant Medications    venlafaxine (EFFEXOR-XR) 37.5 MG 24 hr capsule    Gastroesophageal reflux disease with esophagitis without hemorrhage     GERD RECOMMENDATIONS  Please be advised of condition course.  - Take PPI in the morning 30-60 minutes before breakfast  - I recommend ongoing Education for lifestyle modifications to help control/reduce reflux/abdominal pain including: avoid large meals, avoid eating within 2-3 hours of bedtime (avoid late night eating & lying down soon after eating), elevate head of bed if nocturnal symptoms are present, smoking cessation (if current smoker), & weight loss (if overweight).   - please be advised to avoid known foods which  trigger reflux symptoms & to minimize/avoid high-fat foods, chocolate, caffeine, citrus, alcohol, & tomato products.  - Advised to avoid/limit use of NSAID's, since they can cause GI upset, bleeding, and/or ulcers. If needed, take with food.          Relevant Medications    pantoprazole (PROTONIX) 20 MG tablet     Other Visit Diagnoses       Need for pneumococcal vaccine        Relevant Orders    (In Office Administered) Pneumococcal Conjugate Vaccine (20 Valent) (IM) (Preferred) (Completed)          Future Appointments       Date Provider Specialty Appt Notes    4/12/2024 Jared Vo MD Family Medicine Annual check up    4/16/2024  Lab     4/18/2024  Radiology .           Medication Management for assessment above:   Medication List with Changes/Refills   Changed and/or Refilled Medications    Modified Medication Previous Medication    AMLODIPINE (NORVASC) 5 MG TABLET amLODIPine (NORVASC) 5 MG tablet       amlodipine 5 mg tablet  TAKE 1 TABLET BY MOUTH EVERY DAY Strength: 5 mg    amlodipine 5 mg tablet  TAKE 1 TABLET BY MOUTH EVERY DAY Strength: 5 mg    LOSARTAN-HYDROCHLOROTHIAZIDE 100-25 MG (HYZAAR) 100-25 MG PER TABLET losartan-hydrochlorothiazide 100-25 mg (HYZAAR) 100-25 mg per tablet       Take 1 tablet by mouth once daily.    Take 1 tablet by mouth once daily.    PANTOPRAZOLE (PROTONIX) 20 MG TABLET pantoprazole (PROTONIX) 20 MG tablet       Take 1 tablet (20 mg total) by mouth once daily.    TAKE 1 TABLET EVERY DAY    VENLAFAXINE (EFFEXOR-XR) 37.5 MG 24 HR CAPSULE venlafaxine (EFFEXOR-XR) 37.5 MG 24 hr capsule       Take 1 capsule (37.5 mg total) by mouth once daily.    Take 1 capsule (37.5 mg total) by mouth once daily.       Jared Vo M.D.  ==========================================================================  Subjective:   Patient ID: Skinny Del Cid is a 68 y.o. male.  has a past medical history of Hypertension, Lung nodule, MARK (obstructive sleep apnea), and Skin cancer.    Chief Complaint: Annual Exam      History of Present Illness  The patient is a 8-year-old male presenting for his annual wellness exam, medication refill, and annual blood work. His medical history includes hypertension, which is currently managed with amlodipine, losartan, and hydrochlorothiazide. He is also on pantoprazole for reflux, and Effexor 37.5 mg for anxiety and depression. He has [employer] and utilizes mail-order medications. His last visit with me was in 2024.    The patient is scheduled for laboratory tests on Tuesday morning. He reports no adverse effects from his current medication regimen. His colonoscopy is current until . He has not been prescribed any cholesterol medications, noting that his cholesterol levels have never exceeded 200.    Two years ago, the patient was diagnosed with a pulmonary nodule and is inquiring about the necessity of a follow-up. He was previously under the care of Dr. Lainez in [place] for this condition.   He does not recall any family history of heart attack or stroke. His grandmothers had heart disease in their 80s and they ended up  of congestive heart failure. His mother and son have lupus.    Problem List Items Addressed This Visit       Pulmonary nodule (Chronic)    Overview     8.5 mm RML nodule [facility]     Narrative & Impression  EXAMINATION:  CT CHEST WITHOUT CONTRAST     CLINICAL HISTORY:  Lung nodule, > 8mm; Solitary pulmonary nodule     TECHNIQUE:  Low-dose non-contrast axial images were acquired through the chest.  Subsequently, coronal and sagittal reconstructed images were generated from the source data.  Axial MIPs were generated to improve nodule detection.     COMPARISON:  CT abdomen and pelvis-2005.     FINDINGS:  The soft tissue structures at the base of the neck are unremarkable.  There is calcified atherosclerotic plaque deposition within the thoracic aortic arch.  There are aortic annulus calcifications.  There are coronary artery  calcifications.  No significant volume of pericardial fluid.  The trachea and mainstem bronchi are unremarkable.  No pathologically enlarged lymph nodes in the chest or axilla.     There are multiple scattered solid pulmonary nodules present within the chest.  In particular, there is a 10 mm solid nodule present within the superior right middle lobe abutting the interlobar fissure on series 4, image 225.  There is a 4.5 mm solid nodule in the subpleural left upper lobe laterally on series 4, image 140, and there is a 3 mm solid nodule within the left upper lobe on series 4, image 199.  There is a 3-4 mm solid nodule in the left lower lobe on series 4, image 280.  There is scattered linear atelectasis versus fibrosis present within the left lingula and within both lower lobes.  No significant volume of pleural fluid or pneumothorax.  No airspace consolidation.  No bronchiectasis.  No emphysematous lung architecture appreciated.     There is an approximately 45 x 53 mm water attenuation hypodensity present within the lateral segment of the left hepatic lobe on series 2, image 101.  There are multiple bilateral renal hypodensities present, incompletely assessed, which most likely represent cysts.  Consider additional evaluation with renal ultrasound for confirmation.  There are postoperative changes of prior cholecystectomy.  There are small foci of accessory splenic tissue observed adjacent to the pancreatic tail on series 2, image 107.     There is multilevel degenerative change of the thoracic spine in the form of marginal osteophyte formation and disc space narrowing.     Impression:     1. Multiple scattered solid pulmonary nodules measuring up to 10 mm in size.  No prior imaging studies are available for review to assess for temporal stability.  2. Probable left hepatic lobe cyst and bilateral renal cysts.  Consider confirmation with abdominal/renal ultrasound.  3. Status post cholecystectomy.  Yellow Pulmonary  Nodule 2017 Alert:     Yellow Actionable Finding (Radiology: Volume 284: Number 1-July 2017)     Fleischner Guidelines do not apply in patients younger than 35 years, immunocompromised patients or patients with cancer. Risk assignment based on ACCP with low risk being less than 5% and high risk combining intermediate and high risk categories.     UNEXPECTED FINDINGS:  Incidental Pulmonary Nodule Multiple Solid  most suspicous >8 mm (4)     RECOMMENDATIONS:  If Low Risk CT Chest without contrast at 3-6 months, then consider CT at 18-24 months, If High Risk CT at 3-6 months, then at 18-24 months     This report was flagged in Epic as abnormal.        Electronically signed by: Franco Jauregui MD  Date:                                            09/09/2022  Time:                                           15:43           Exam Ended: 09/09/22 13:18 CDT                Current Assessment & Plan     Update CT of the chest.  Follow-up with Pulmonary.         Hypertension, essential - Primary (Chronic)    Overview     April 2024:  Blood pressure well controlled.  CHRONIC.  March 2023:  Hypertension Medications               amLODIPine (NORVASC) 5 MG tablet amlodipine 5 mg tablet  TAKE 1 TABLET BY MOUTH EVERY DAY Strength: 5 mg    losartan-hydrochlorothiazide 100-25 mg (HYZAAR) 100-25 mg per tablet Take 1 tablet by mouth once daily.          STABLE. BP Reviewed.  Compliant with BP medications. No SE reported.   (-) CP, SOB, palpitations, dizziness, lightheadedness, HA, arm numbness, tingling or weakness, syncope.  Creatinine   Date Value Ref Range Status   03/20/2023 1.0 0.5 - 1.4 mg/dL Final     Results for orders placed or performed in visit on 01/31/22   IN OFFICE EKG 12-LEAD (to [place])    Collection Time: 01/31/22 11:18 AM    Narrative    Test Reason : Z13.6,    Vent. Rate : 074 BPM     Atrial Rate : 074 BPM     P-R Int : 178 ms          QRS Dur : 094 ms      QT Int : 384 ms       P-R-T Axes : 012 046 017 degrees     QTc Int :  426 ms    Normal sinus rhythm  Normal ECG  No previous ECGs available  Confirmed by MARLO FATIMA, DONNA SONI ([id number]) on 2/1/2022 8:20:16 PM    Referred By: MD MILAN           Confirmed By:DONNA SELLERS MD            Current Assessment & Plan     Counseled on importance of hypertension disease course, I recommend ongoing Education for DASH-diet and exercise.  Counseled on medication regimen importance of treating high blood pressure.  Please be advised of risk of untreated blood pressure as discussed.  Please advised of ER precautions were given for symptoms of hypertensive urgency and emergency.           Encounter for long-term (current) use of medications (Chronic)    Overview     April 2024: Reviewed labs.  March 2023: Reviewed labs.  January 2023: Reviewed labs.  November 2022: Reviewed September 2022: Reviewed labs.  CHRONIC. Stable. Compliant with medications for managed conditions. See medication list. No SE reported.   Routine lab analysis is being monitored. Refills were addressed.  Lab Results   Component Value Date    HGB 14.3 03/20/2023       Chemistry        Component Value Date/Time     03/20/2023 0833    K 4.6 03/20/2023 0833     03/20/2023 0833    CO2 26 03/20/2023 0833    BUN 14 03/20/2023 0833    CREATININE 1.0 03/20/2023 0833    GLU 97 03/20/2023 0833        Component Value Date/Time    CALCIUM 10.1 02/07/2022 0805    ALKPHOS 49 (L) 02/07/2022 0805    AST 21 02/07/2022 0805    ALT 28 02/07/2022 0805    BILITOT 0.4 02/07/2022 0805    ESTGFRAFRICA >60.0 02/07/2022 0805    EGFRNONAA >60.0 02/07/2022 0805        Lab Results   Component Value Date    TSH 0.943 03/20/2023            Current Assessment & Plan     Complete history and physical was completed today.  Complete and thorough medication reconciliation was performed.  Discussed risks and benefits of medications.  Advised patient on orders and health maintenance.  We discussed old records and old labs if available.  Will request  any records not available through epic.  Continue current medications listed on your summary sheet.           Anxious depression    Overview     April 2024: Chronic.  Stable.  Reports compliance.  No side effects reported.  Symptoms are controlled.  March 2023:  Patient doing well on Effexor 37.5 milligrams daily.  Reports compliance.  No side effects reported.  Symptoms are controlled.  Denies any SI HI or hallucinations.    Previous history :New problem.  Patient reports has been having worsening depression over the last few months.  Patient is taking care of his parents at his house which is taxing for his family.  Patient recently retired.  He has decreased interested doing things he enjoys.  Patient has been exercising at [employer].    He gets anxious and worried about his health.  Patient reports declining memory short-term.    Denies SI HI or hallucinations.         Current Assessment & Plan     Continue Effexor.Please be advised of condition course.  SNRI/SSRI is first-line treatment for this condition.  Please be advised of the risk of discontinuing this medication without tapering/contacting MD.  Patient has been advised of side effects, and all questions were answered.  Patient voiced understanding.  Patient will follow up routinely and notify us if having any side effects or worsening or persistent symptoms.  ER precautions were given. Antidepressant/Antianxiety Medication Initiation:  Patient informed of risks, benefits, and potential side effects of medication and accepts informed consent.  Common side effects include nausea, fatigue, headache, insomnia, etc see medication insert for complete side effect profile.  Most importantly be advised of the possibility of new or worsening suicidal thoughts/depression/anxiety etcetera.  Please be advised to stop the medication immediately and seek urgent treatment if this occurs.  Therefore please do not to abruptly discontinue medication without physician  guidance except in cases of sudden onset or worsening of SI.            Gastroesophageal reflux disease with esophagitis without hemorrhage    Overview     Chronic.  Stable.  Reports compliance with pantoprazole 20 milligrams daily.  No side effects reported.  Symptoms are controlled.         Current Assessment & Plan     GERD RECOMMENDATIONS  Please be advised of condition course.  - Take PPI in the morning 30-60 minutes before breakfast  - I recommend ongoing Education for lifestyle modifications to help control/reduce reflux/abdominal pain including: avoid large meals, avoid eating within 2-3 hours of bedtime (avoid late night eating & lying down soon after eating), elevate head of bed if nocturnal symptoms are present, smoking cessation (if current smoker), & weight loss (if overweight).   - please be advised to avoid known foods which trigger reflux symptoms & to minimize/avoid high-fat foods, chocolate, caffeine, citrus, alcohol, & tomato products.  - Advised to avoid/limit use of NSAID's, since they can cause GI upset, bleeding, and/or ulcers. If needed, take with food.           Other Visit Diagnoses       Need for pneumococcal vaccine                 Review of patient's allergies indicates:  No Known Allergies  Current Outpatient Medications   Medication Instructions    amLODIPine (NORVASC) 5 MG tablet amlodipine 5 mg tablet  TAKE 1 TABLET BY MOUTH EVERY DAY Strength: 5 mg    losartan-hydrochlorothiazide 100-25 mg (HYZAAR) 100-25 mg per tablet 1 tablet, Oral, Daily    pantoprazole (PROTONIX) 20 mg, Oral, Daily    venlafaxine (EFFEXOR-XR) 37.5 mg, Oral, Daily      I have reviewed the PMH, social history, FamilyHx, surgical history, allergies and medications documented / confirmed by the patient at the time of this visit.  Review of Systems   Constitutional:  Negative for activity change and unexpected weight change.   HENT:  Negative for hearing loss, rhinorrhea and trouble swallowing.    Eyes:  Negative for  "discharge and visual disturbance.   Respiratory:  Negative for chest tightness and wheezing.    Cardiovascular:  Negative for chest pain and palpitations.   Gastrointestinal:  Negative for blood in stool, constipation, diarrhea and vomiting.   Endocrine: Negative for polydipsia and polyuria.   Genitourinary:  Negative for difficulty urinating, hematuria and urgency.   Musculoskeletal:  Negative for arthralgias, joint swelling and neck pain.   Neurological:  Negative for weakness and headaches.   Psychiatric/Behavioral:  Negative for confusion and dysphoric mood.      Objective:   /84   Pulse 75   Ht 5' 8" (1.727 m)   Wt 112.4 kg (247 lb 14.4 oz)   SpO2 96%   BMI 37.69 kg/m²   Physical Exam  Vitals and nursing note reviewed.   Constitutional:       General: He is not in acute distress.     Appearance: He is well-developed. He is not diaphoretic.   HENT:      Head: Normocephalic and atraumatic.      Right Ear: External ear normal.      Left Ear: External ear normal.      Nose: Nose normal. No rhinorrhea.   Eyes:      Extraocular Movements: Extraocular movements intact.      Pupils: Pupils are equal, round, and reactive to light.   Cardiovascular:      Rate and Rhythm: Normal rate.      Pulses: Normal pulses.   Pulmonary:      Effort: Pulmonary effort is normal. No respiratory distress.      Breath sounds: Normal breath sounds.   Abdominal:      General: Bowel sounds are normal.      Palpations: Abdomen is soft.   Musculoskeletal:         General: Normal range of motion.      Cervical back: Normal range of motion and neck supple.   Skin:     General: Skin is warm and dry.      Capillary Refill: Capillary refill takes less than 2 seconds.      Findings: No rash.   Neurological:      General: No focal deficit present.      Mental Status: He is alert and oriented to person, place, and time.      Cranial Nerves: No cranial nerve deficit.      Motor: No weakness.      Gait: Gait normal.   Psychiatric:         " Attention and Perception: He is attentive.         Mood and Affect: Mood normal. Mood is not anxious or depressed. Affect is not labile, blunt, angry or inappropriate.         Speech: He is communicative. Speech is not rapid and pressured, delayed, slurred or tangential.         Behavior: Behavior normal. Behavior is not agitated, slowed, aggressive, withdrawn, hyperactive or combative.         Thought Content: Thought content normal. Thought content is not paranoid or delusional. Thought content does not include homicidal or suicidal ideation. Thought content does not include homicidal or suicidal plan.         Cognition and Memory: Memory is not impaired.         Judgment: Judgment normal. Judgment is not impulsive or inappropriate.       Physical Exam  Vital Signs  The patient's blood pressure is 101.    Results  Imaging  Incidental pulmonary nodule was 10 mm in 2023.    Assessment:     1. Hypertension, essential    2. Encounter for long-term (current) use of medications    3. Gastroesophageal reflux disease with esophagitis without hemorrhage    4. Anxious depression    5. Need for pneumococcal vaccine    6. Pulmonary nodule      MDM:   Moderate medical complexity.  Moderate risk.  Total time: 21 minutes.  This includes total time spent on the encounter, which includes face to face time and non-face to face time preparing to see the patient (eg, review of previous medical records, tests), Obtaining and/or reviewing separately obtained history, documenting clinical information in the electronic or other health record, independently interpreting results (not separately reported)/communicating results to the patient/family/caregiver, and/or care coordination (not separately reported).    I have Reviewed and summarized old records.  I have performed thorough medication reconciliation today and discussed risk and benefits of medications.  I have reviewed labs and discussed with patient.  All questions were  answered.  I am requesting old records and will review them once they are available.    I have signed for the following orders AND/OR meds.  Orders Placed This Encounter   Procedures    CT Chest Without Contrast     Standing Status:   Future     Standing Expiration Date:   4/12/2025     Order Specific Question:   May the Radiologist modify the order per protocol to meet the clinical needs of the patient?     Answer:   Yes    (In Office Administered) Pneumococcal Conjugate Vaccine (20 Valent) (IM) (Preferred)    CBC W/ AUTO DIFFERENTIAL     Standing Status:   Future     Standing Expiration Date:   7/11/2025    HEMOGLOBIN A1C     Standing Status:   Future     Standing Expiration Date:   7/11/2025     Medications Ordered This Encounter   Medications    amLODIPine (NORVASC) 5 MG tablet     Sig: amlodipine 5 mg tablet  TAKE 1 TABLET BY MOUTH EVERY DAY Strength: 5 mg     Dispense:  90 tablet     Refill:  4     .    losartan-hydrochlorothiazide 100-25 mg (HYZAAR) 100-25 mg per tablet     Sig: Take 1 tablet by mouth once daily.     Dispense:  90 tablet     Refill:  4     .    pantoprazole (PROTONIX) 20 MG tablet     Sig: Take 1 tablet (20 mg total) by mouth once daily.     Dispense:  90 tablet     Refill:  4    venlafaxine (EFFEXOR-XR) 37.5 MG 24 hr capsule     Sig: Take 1 capsule (37.5 mg total) by mouth once daily.     Dispense:  90 capsule     Refill:  4        Follow up in about 1 year (around 4/12/2025), or if symptoms worsen or fail to improve, for Annual Wellness Exam.  Future Appointments       Date Provider Specialty Appt Notes    4/12/2024 Jared Vo MD Family Medicine Annual check up    4/16/2024  Lab     4/18/2024  Radiology .          If no improvement in symptoms or symptoms worsen, advised to call/follow-up at clinic or go to ER. Patient voiced understanding and all questions/concerns were addressed.   DISCLAIMER: This note was compiled by using a speech recognition dictation system and therefore  please be aware that typographical / speech recognition errors can and do occur.  Please contact me if you see any errors specifically.  Consent was obtained for [employer] recording system prior to the visit.    Jared Vo M.D.       Office: [phone]   [id number] [place] [address]  FAX: [phone] today included increased complexity associated with the care of the episodic problem see above addressed and managing the longitudinal care of the patient due to the serious and/or complex managed problem(s) see above.

## 2024-04-16 ENCOUNTER — LAB VISIT (OUTPATIENT)
Dept: LAB | Facility: HOSPITAL | Age: 69
End: 2024-04-16
Attending: FAMILY MEDICINE
Payer: MEDICARE

## 2024-04-16 DIAGNOSIS — Z13.6 ENCOUNTER FOR LIPID SCREENING FOR CARDIOVASCULAR DISEASE: ICD-10-CM

## 2024-04-16 DIAGNOSIS — Z00.00 ENCOUNTER FOR MEDICAL EXAMINATION TO ESTABLISH CARE: ICD-10-CM

## 2024-04-16 DIAGNOSIS — Z79.899 ENCOUNTER FOR LONG-TERM (CURRENT) USE OF MEDICATIONS: ICD-10-CM

## 2024-04-16 DIAGNOSIS — R41.3 MEMORY LOSS: ICD-10-CM

## 2024-04-16 DIAGNOSIS — Z13.220 ENCOUNTER FOR LIPID SCREENING FOR CARDIOVASCULAR DISEASE: ICD-10-CM

## 2024-04-16 DIAGNOSIS — F41.8 ANXIOUS DEPRESSION: ICD-10-CM

## 2024-04-16 DIAGNOSIS — Z12.5 ENCOUNTER FOR PROSTATE CANCER SCREENING: ICD-10-CM

## 2024-04-16 LAB
ALBUMIN SERPL BCP-MCNC: 3.6 G/DL (ref 3.5–5.2)
ALP SERPL-CCNC: 53 U/L (ref 55–135)
ALT SERPL W/O P-5'-P-CCNC: 21 U/L (ref 10–44)
ANION GAP SERPL CALC-SCNC: 8 MMOL/L (ref 8–16)
AST SERPL-CCNC: 24 U/L (ref 10–40)
BASOPHILS # BLD AUTO: 0.07 K/UL (ref 0–0.2)
BASOPHILS NFR BLD: 0.8 % (ref 0–1.9)
BILIRUB SERPL-MCNC: 0.4 MG/DL (ref 0.1–1)
BUN SERPL-MCNC: 12 MG/DL (ref 8–23)
CALCIUM SERPL-MCNC: 9.6 MG/DL (ref 8.7–10.5)
CHLORIDE SERPL-SCNC: 107 MMOL/L (ref 95–110)
CHOLEST SERPL-MCNC: 175 MG/DL (ref 120–199)
CHOLEST/HDLC SERPL: 5.3 {RATIO} (ref 2–5)
CO2 SERPL-SCNC: 27 MMOL/L (ref 23–29)
COMPLEXED PSA SERPL-MCNC: 0.49 NG/ML (ref 0–4)
CREAT SERPL-MCNC: 0.9 MG/DL (ref 0.5–1.4)
DIFFERENTIAL METHOD BLD: ABNORMAL
EOSINOPHIL # BLD AUTO: 0.4 K/UL (ref 0–0.5)
EOSINOPHIL NFR BLD: 4.6 % (ref 0–8)
ERYTHROCYTE [DISTWIDTH] IN BLOOD BY AUTOMATED COUNT: 13.8 % (ref 11.5–14.5)
EST. GFR  (NO RACE VARIABLE): >60 ML/MIN/1.73 M^2
ESTIMATED AVG GLUCOSE: 111 MG/DL (ref 68–131)
GLUCOSE SERPL-MCNC: 95 MG/DL (ref 70–110)
HBA1C MFR BLD: 5.5 % (ref 4–5.6)
HCT VFR BLD AUTO: 43.5 % (ref 40–54)
HDLC SERPL-MCNC: 33 MG/DL (ref 40–75)
HDLC SERPL: 18.9 % (ref 20–50)
HGB BLD-MCNC: 13.7 G/DL (ref 14–18)
HGB BLD-MCNC: 13.7 G/DL (ref 14–18)
IMM GRANULOCYTES # BLD AUTO: 0.02 K/UL (ref 0–0.04)
IMM GRANULOCYTES NFR BLD AUTO: 0.2 % (ref 0–0.5)
LDLC SERPL CALC-MCNC: 120 MG/DL (ref 63–159)
LYMPHOCYTES # BLD AUTO: 2 K/UL (ref 1–4.8)
LYMPHOCYTES NFR BLD: 20.9 % (ref 18–48)
MCH RBC QN AUTO: 27.9 PG (ref 27–31)
MCHC RBC AUTO-ENTMCNC: 31.5 G/DL (ref 32–36)
MCV RBC AUTO: 89 FL (ref 82–98)
MONOCYTES # BLD AUTO: 0.8 K/UL (ref 0.3–1)
MONOCYTES NFR BLD: 8.4 % (ref 4–15)
NEUTROPHILS # BLD AUTO: 6.1 K/UL (ref 1.8–7.7)
NEUTROPHILS NFR BLD: 65.1 % (ref 38–73)
NONHDLC SERPL-MCNC: 142 MG/DL
NRBC BLD-RTO: 0 /100 WBC
PLATELET # BLD AUTO: 251 K/UL (ref 150–450)
PMV BLD AUTO: 12.1 FL (ref 9.2–12.9)
POTASSIUM SERPL-SCNC: 4.3 MMOL/L (ref 3.5–5.1)
PROT SERPL-MCNC: 7 G/DL (ref 6–8.4)
RBC # BLD AUTO: 4.91 M/UL (ref 4.6–6.2)
SODIUM SERPL-SCNC: 142 MMOL/L (ref 136–145)
TRIGL SERPL-MCNC: 110 MG/DL (ref 30–150)
TSH SERPL DL<=0.005 MIU/L-ACNC: 0.96 UIU/ML (ref 0.4–4)
WBC # BLD AUTO: 9.33 K/UL (ref 3.9–12.7)

## 2024-04-16 PROCEDURE — 80061 LIPID PANEL: CPT | Performed by: FAMILY MEDICINE

## 2024-04-16 PROCEDURE — 85025 COMPLETE CBC W/AUTO DIFF WBC: CPT | Performed by: FAMILY MEDICINE

## 2024-04-16 PROCEDURE — 84443 ASSAY THYROID STIM HORMONE: CPT | Performed by: FAMILY MEDICINE

## 2024-04-16 PROCEDURE — 36415 COLL VENOUS BLD VENIPUNCTURE: CPT | Mod: PO | Performed by: FAMILY MEDICINE

## 2024-04-16 PROCEDURE — 83036 HEMOGLOBIN GLYCOSYLATED A1C: CPT | Performed by: FAMILY MEDICINE

## 2024-04-16 PROCEDURE — 84153 ASSAY OF PSA TOTAL: CPT | Performed by: FAMILY MEDICINE

## 2024-04-16 PROCEDURE — 80053 COMPREHEN METABOLIC PANEL: CPT | Performed by: FAMILY MEDICINE

## 2024-04-18 ENCOUNTER — HOSPITAL ENCOUNTER (OUTPATIENT)
Dept: RADIOLOGY | Facility: HOSPITAL | Age: 69
Discharge: HOME | End: 2024-04-18
Attending: FAMILY MEDICINE
Payer: MEDICARE

## 2024-04-18 ENCOUNTER — PATIENT MESSAGE (OUTPATIENT)
Dept: FAMILY MEDICINE | Facility: CLINIC | Age: 69
End: 2024-04-18
Payer: MEDICARE

## 2024-04-18 ENCOUNTER — TELEPHONE (OUTPATIENT)
Dept: FAMILY MEDICINE | Facility: CLINIC | Age: 69
End: 2024-04-18
Payer: MEDICARE

## 2024-04-18 DIAGNOSIS — R91.1 PULMONARY NODULE: Chronic | ICD-10-CM

## 2024-04-18 PROCEDURE — 71250 CT THORAX DX C-: CPT | Mod: TC,PO

## 2024-04-18 PROCEDURE — 71250 CT THORAX DX C-: CPT | Mod: 26,,, | Performed by: RADIOLOGY

## 2024-04-18 NOTE — PROGRESS NOTES
Make follow-up lab appointment per recommendation below.  Check to see if patient has seen the results through my chart.  If not then,  #CALL THE PATIENT# to discuss results/see if they have questions and document verification of contact. Make F/U appt if needed. [phone]    #My interpretation that was sent to them through hi5:  Skinny, I have reviewed your recent blood work.     PSA screening for prostate cancer is within normal limits.  Repeat annually.  Your complete blood count is stable.    Your metabolic panel which shows your glucose, kidney function, electrolytes, and liver function is stable.   Thyroid study is normal.   Your cholesterol is stable.    Your hemoglobin A1c is stable.  This test is gold standard screening test for diabetes.  It is a measures 3 months of your average blood sugar.  =========================  Also please address any outstanding health maintenance that may be due: TETANUS VACCINE Never done  Shingles Vaccine(1 of 2) Never done  RSV Vaccine (Age 60+ and Pregnant patients)(1 - 1-dose 60+ series) Never done

## 2024-04-18 NOTE — PROGRESS NOTES
+++++++CALL patient with results and Document verification.  Schedule follow-up if needed.  [phone]  Please follow-up to discuss the CT scan of the chest in detail to review findings.  Further evaluation of the lymph nodes of the left axilla may be needed with ultrasound.  Also recommend that you follow-up with Pulmonary to review this scan.

## 2024-04-26 ENCOUNTER — OFFICE VISIT (OUTPATIENT)
Dept: FAMILY MEDICINE | Facility: CLINIC | Age: 69
End: 2024-04-26
Payer: MEDICARE

## 2024-04-26 ENCOUNTER — HOSPITAL ENCOUNTER (OUTPATIENT)
Dept: RADIOLOGY | Facility: HOSPITAL | Age: 69
Discharge: HOME | End: 2024-04-26
Attending: FAMILY MEDICINE
Payer: MEDICARE

## 2024-04-26 VITALS
HEART RATE: 75 BPM | SYSTOLIC BLOOD PRESSURE: 136 MMHG | OXYGEN SATURATION: 95 % | DIASTOLIC BLOOD PRESSURE: 88 MMHG | HEIGHT: 68 IN | BODY MASS INDEX: 37.41 KG/M2 | WEIGHT: 246.81 LBS

## 2024-04-26 DIAGNOSIS — N28.1 RENAL CYST: Chronic | ICD-10-CM

## 2024-04-26 DIAGNOSIS — R59.1 LYMPHADENOPATHY: Primary | ICD-10-CM

## 2024-04-26 DIAGNOSIS — I70.0 AORTIC ATHEROSCLEROSIS: ICD-10-CM

## 2024-04-26 DIAGNOSIS — R91.1 PULMONARY NODULE: Chronic | ICD-10-CM

## 2024-04-26 DIAGNOSIS — R59.1 LYMPHADENOPATHY: ICD-10-CM

## 2024-04-26 PROCEDURE — 1160F RVW MEDS BY RX/DR IN RCRD: CPT | Mod: CPTII,S$GLB,, | Performed by: FAMILY MEDICINE

## 2024-04-26 PROCEDURE — 3008F BODY MASS INDEX DOCD: CPT | Mod: CPTII,S$GLB,, | Performed by: FAMILY MEDICINE

## 2024-04-26 PROCEDURE — 3075F SYST BP GE 130 - 139MM HG: CPT | Mod: CPTII,S$GLB,, | Performed by: FAMILY MEDICINE

## 2024-04-26 PROCEDURE — 99999 PR PBB SHADOW E&M-EST. PATIENT-LVL IV: CPT | Mod: PBBFAC,,, | Performed by: FAMILY MEDICINE

## 2024-04-26 PROCEDURE — 76882 US LMTD JT/FCL EVL NVASC XTR: CPT | Mod: 26,LT,, | Performed by: STUDENT IN AN ORGANIZED HEALTH CARE EDUCATION/TRAINING PROGRAM

## 2024-04-26 PROCEDURE — 3288F FALL RISK ASSESSMENT DOCD: CPT | Mod: CPTII,S$GLB,, | Performed by: FAMILY MEDICINE

## 2024-04-26 PROCEDURE — 1101F PT FALLS ASSESS-DOCD LE1/YR: CPT | Mod: CPTII,S$GLB,, | Performed by: FAMILY MEDICINE

## 2024-04-26 PROCEDURE — 1159F MED LIST DOCD IN RCRD: CPT | Mod: CPTII,S$GLB,, | Performed by: FAMILY MEDICINE

## 2024-04-26 PROCEDURE — 76882 US LMTD JT/FCL EVL NVASC XTR: CPT | Mod: TC,PO,LT

## 2024-04-26 PROCEDURE — 3044F HG A1C LEVEL LT 7.0%: CPT | Mod: CPTII,S$GLB,, | Performed by: FAMILY MEDICINE

## 2024-04-26 PROCEDURE — 1126F AMNT PAIN NOTED NONE PRSNT: CPT | Mod: CPTII,S$GLB,, | Performed by: FAMILY MEDICINE

## 2024-04-26 PROCEDURE — 3079F DIAST BP 80-89 MM HG: CPT | Mod: CPTII,S$GLB,, | Performed by: FAMILY MEDICINE

## 2024-04-26 PROCEDURE — 99214 OFFICE O/P EST MOD 30 MIN: CPT | Mod: S$GLB,,, | Performed by: FAMILY MEDICINE

## 2024-04-26 NOTE — PROGRESS NOTES
1st check to see if patient has seen the results.  If not then  CALL patient with results and Document verification.  Schedule follow-up if needed.  [phone]  Ultrasound has been reviewed by radiology.  There is no significant abnormalities of the lymph nodes on ultrasound.  I believe that the findings are related to your scar from surgery on the left shoulder.  No further follow-up as needed at this time.  Enjoy your trip!

## 2024-04-26 NOTE — PROGRESS NOTES
PLAN:      Assessment & Plan  1. Potential lymphadenopathy in the left axilla.  Upon examination, no palpable abnormalities were detected. An ultrasound of the left axilla will be ordered for further investigation.    Problem List Items Addressed This Visit       Renal cyst (Chronic)    Pulmonary nodule (Chronic)    Aortic atherosclerosis     Recommend targeting LDL less than 100.  Consider baby aspirin daily.  Continue controlling blood pressure.         Lymphadenopathy - Primary     Patient had a history of left shoulder replacement years ago.  He denies any recent infections.  He does not any pain or swelling in the under arm.  He does have chronic shoulder pain on the left.    I will check ultrasound of the left axilla for reassurance.    Physical exam reveals no lymphadenopathy.         Relevant Orders    US Soft Tissue Axilla, Left (Completed)   Other findings on CT including renal and hepatic cysts are stable.  No further imaging needed at this time.  Pulmonary nodules were evaluated by Pulmonary and are reportedly stable.       Medication Management for assessment above:   Medication List with Changes/Refills   Current Medications    AMLODIPINE (NORVASC) 5 MG TABLET    amlodipine 5 mg tablet  TAKE 1 TABLET BY MOUTH EVERY DAY Strength: 5 mg    LOSARTAN-HYDROCHLOROTHIAZIDE 100-25 MG (HYZAAR) 100-25 MG PER TABLET    Take 1 tablet by mouth once daily.    PANTOPRAZOLE (PROTONIX) 20 MG TABLET    Take 1 tablet (20 mg total) by mouth once daily.    VENLAFAXINE (EFFEXOR-XR) 37.5 MG 24 HR CAPSULE    Take 1 capsule (37.5 mg total) by mouth once daily.       Jared oV M.D.  ==========================================================================  Subjective:   Patient ID: Skinny Del Cid is a 68 y.o. male.  has a past medical history of Hypertension, Lung nodule, MARK (obstructive sleep apnea), and Skin cancer.   Chief Complaint: Results (CT)      History of Present Illness  The patient is a  "71-year-old male following up for low-dose CT scan results. He did see the pulmonologist about the pulmonary nodules; however, there was a finding of his left axilla with questionable lymphadenopathy. The patient reports he can not feel anything in that area, not having any pain. I will assess him with a physical exam today and order ultrasound per radiologist's recommendations.    The patient recently consulted with a pulmonologist regarding the presence of a pulmonary nodule, which has not exhibited any growth and was deemed non-concerning. He reports no palpable abnormalities in his left axilla and denies any associated pain. He denies any recent infections and reports feeling generally well. The pulmonologist did not detect any abnormalities in his left axilla. The patient queries whether the nodule could be indicative of fatty tumors. His recent blood work indicated a normal white blood count, and he expresses concern about potential underlying issues. His diet is minimal in red meat, and he supplements his diet with multivitamins.  No results found for: "UIBC", "IRON", "TRANS", "TRANSFERRIN", "TIBC", "LABIRON", "FESATURATED"   Lab Results   Component Value Date    WBFOBNTD43 533 03/20/2023     Lab Results   Component Value Date    FOLATE 16.5 03/20/2023     Lab Results   Component Value Date    WBC 9.33 04/16/2024    HGB 13.7 (L) 04/16/2024    HGB 13.7 (L) 04/16/2024    HCT 43.5 04/16/2024    MCV 89 04/16/2024     04/16/2024           Supplemental Information  He had shoulder surgery for a dislocated football injury when he was 17. He still has problems with his hip. If he does a lot, he gets a little sore in the joint itself, but it does not last long.    Problem List Items Addressed This Visit       Renal cyst (Chronic)    Overview     Simple cyst Seen on abdominal ultrasound.    US Abdomen Complete  Narrative: EXAMINATION:  US ABDOMEN COMPLETE    CLINICAL HISTORY:  Cyst of kidney, " acquired    TECHNIQUE:  Complete abdominal ultrasound (including pancreas, aorta, liver, gallbladder, common bile duct, IVC, kidneys, and spleen) was performed.    COMPARISON:  09/16/2022    FINDINGS:  Pancreas: Obscured by overlying bowel gas.    Aorta: Obscured    Gallbladder: The gallbladder is surgically absent.    Biliary system: 9.8 mm common bile duct.  No intrahepatic ductal dilatation.    Liver: 18 cm, homogeneous parenchymal echotexture. Limited visualization of the left lobe with 5.5 cm simple cyst.    Inferior vena cava: Normal in appearance.    Right kidney: 14 cm. Multiple simple cysts.    Left kidney: 14.5 cm. Multiple simple and minimally complex cysts measuring up to 8.4 cm    Spleen: 9 cm.  No intrinsic abnormality.    Miscellaneous: No ascites.  Impression: Limited study secondary to bowel gas.    Bilateral renal cysts, not significantly changed.  Benign left hepatic lobe cyst.    Prominence of the extrahepatic common bile duct likely related to post cholecystectomy state.  Correlate clinically.    Electronically signed by: Luisito Rae MD  Date:    03/20/2023  Time:    09:04             Pulmonary nodule (Chronic)    Overview     8.5 mm RML nodule [facility] 2019    Narrative & Impression  EXAMINATION:  CT CHEST WITHOUT CONTRAST     CLINICAL HISTORY:  Lung nodule, > 8mm; Solitary pulmonary nodule     TECHNIQUE:  Low-dose non-contrast axial images were acquired through the chest.  Subsequently, coronal and sagittal reconstructed images were generated from the source data.  Axial MIPs were generated to improve nodule detection.     COMPARISON:  CT abdomen and pelvis-07/03/2005.     FINDINGS:  The soft tissue structures at the base of the neck are unremarkable.  There is calcified atherosclerotic plaque deposition within the thoracic aortic arch.  There are aortic annulus calcifications.  There are coronary artery calcifications.  No significant volume of pericardial fluid.  The trachea and mainstem  bronchi are unremarkable.  No pathologically enlarged lymph nodes in the chest or axilla.     There are multiple scattered solid pulmonary nodules present within the chest.  In particular, there is a 10 mm solid nodule present within the superior right middle lobe abutting the interlobar fissure on series 4, image 225.  There is a 4.5 mm solid nodule in the subpleural left upper lobe laterally on series 4, image 140, and there is a 3 mm solid nodule within the left upper lobe on series 4, image 199.  There is a 3-4 mm solid nodule in the left lower lobe on series 4, image 280.  There is scattered linear atelectasis versus fibrosis present within the left lingula and within both lower lobes.  No significant volume of pleural fluid or pneumothorax.  No airspace consolidation.  No bronchiectasis.  No emphysematous lung architecture appreciated.     There is an approximately 45 x 53 mm water attenuation hypodensity present within the lateral segment of the left hepatic lobe on series 2, image 101.  There are multiple bilateral renal hypodensities present, incompletely assessed, which most likely represent cysts.  Consider additional evaluation with renal ultrasound for confirmation.  There are postoperative changes of prior cholecystectomy.  There are small foci of accessory splenic tissue observed adjacent to the pancreatic tail on series 2, image 107.     There is multilevel degenerative change of the thoracic spine in the form of marginal osteophyte formation and disc space narrowing.     Impression:     1. Multiple scattered solid pulmonary nodules measuring up to 10 mm in size.  No prior imaging studies are available for review to assess for temporal stability.  2. Probable left hepatic lobe cyst and bilateral renal cysts.  Consider confirmation with abdominal/renal ultrasound.  3. Status post cholecystectomy.  Yellow Pulmonary Nodule 2017 Alert:     Yellow Actionable Finding (Radiology: Volume 284: Number 1-July  2017)     Fleischner Guidelines do not apply in patients younger than 35 years, immunocompromised patients or patients with cancer. Risk assignment based on ACCP with low risk being less than 5% and high risk combining intermediate and high risk categories.     UNEXPECTED FINDINGS:  Incidental Pulmonary Nodule Multiple Solid  most suspicous >8 mm (4)     RECOMMENDATIONS:  If Low Risk CT Chest without contrast at 3-6 months, then consider CT at 18-24 months, If High Risk CT at 3-6 months, then at 18-24 months     This report was flagged in Epic as abnormal.        Electronically signed by: Franco Jauregui MD  Date:                                            09/09/2022  Time:                                           15:43           Exam Ended: 09/09/22 13:18 CDT                  Aortic atherosclerosis    Overview     Chronic.  Seen on imaging previously.  Lab Results   Component Value Date    LDLCALC 120.0 04/16/2024              Current Assessment & Plan     Recommend targeting LDL less than 100.  Consider baby aspirin daily.  Continue controlling blood pressure.         Lymphadenopathy - Primary    Overview     Narrative & Impression  EXAMINATION:  CT CHEST WITHOUT CONTRAST     CLINICAL HISTORY:  Lung nodule, > 8mm; Solitary pulmonary nodule     TECHNIQUE:  Low dose axial images, sagittal and coronal reformations were obtained from the thoracic inlet to the lung bases. Contrast was not administered.     COMPARISON:  09/09/2022     FINDINGS:  Fat haziness along with an increased number of lymph nodes is noted within the left axilla.  This spans 8.3 by 4.3 cm and has intervening fat.  This could relate to an inflammatory process such as a cellulitis.  This would be an unusual appearance for liposarcoma given that it does not appear to be displacing the axillary nodes but surrounding them.  Infectious inflammatory and neoplastic etiologies are not excluded.  Postprocedural changes could appear similar.  Clinical  correlation is necessary.     A defect is noted within the left humeral head suggestive of a bone cyst measuring 3.0 by 1.5 cm.  This is likely unchanged since the prior given changes in slice positioning.  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  Surgical anchors are noted within the left scapula near the glenoid similar to on the prior     Mild atherosclerotic changes are noted within the aortic arch.  Mild coronary calcifications are noted increasing the patient's coronary risk.  A large hypodensity is noted in the left lobe of the liver of 5.2 cm unchanged since the prior without worrisome characteristics statistically favored relate to a cyst.  A similar hypodensity is noted near the hepatic confluence unchanged since the prior suggestive a cyst of 11 mm surgical clips are noted suggestive cholecystectomy.  Three left renal hypodensities are noted within our field of view similar to on the prior suggestive of cysts the largest of 5.7 cm without worrisome characteristics.  A single right renal hypodensity is noted within our field of view similar to on the prior statistically favored relate to a cyst as well.  The spleen is not visualized consistent with splenectomy with 2 nodules noted in the left upper quadrant of the abdomen suggestive accessory splenules.     A 4 mm pulmonary nodule is noted at the left lung base image 286 sequence 4 stable since the prior     A mass is noted along the minor fissure on the right 1 cm in size unchanged since the prior image 234 sequence 4.     A 4 mm nodule is noted left upper lobe image 149 sequence 4 stable since the prior     A stable 3 mm nodule is noted left upper lobe image 204 sequence 4     A stable lingular nodular is noted of 4 mm image 336 sequence 4     Impression:     1. Interval detrimental change with fat haziness in the left axilla and multiple lymph nodes more numerous than expected.  This most likely relates to an  inflammatory process such as a cellulitis or infection.  Neoplastic or inflammatory disease is not excluded, clinical correlation is necessary.  Follow-up for resolution may be of use.  Evaluation of the axillary nodes with ultrasound may add additional information.  2. Multiple pulmonary nodules stable since the prior the largest of 10 mm unchanged favoring a benign etiology.  3. Multiple renal and hepatic hypodensities stable since the prior suggestive of cysts        Electronically signed by:Salvatore Mendoza MD  Date:                                            04/18/2024  Time:                                           14:11           Exam Ended: 04/18/24 11:36 CDT                  Current Assessment & Plan     Patient had a history of left shoulder replacement years ago.  He denies any recent infections.  He does not any pain or swelling in the under arm.  He does have chronic shoulder pain on the left.    I will check ultrasound of the left axilla for reassurance.    Physical exam reveals no lymphadenopathy.             Review of patient's allergies indicates:  No Known Allergies  Current Outpatient Medications   Medication Instructions    amLODIPine (NORVASC) 5 MG tablet amlodipine 5 mg tablet  TAKE 1 TABLET BY MOUTH EVERY DAY Strength: 5 mg    losartan-hydrochlorothiazide 100-25 mg (HYZAAR) 100-25 mg per tablet 1 tablet, Oral, Daily    pantoprazole (PROTONIX) 20 mg, Oral, Daily    venlafaxine (EFFEXOR-XR) 37.5 mg, Oral, Daily      I have reviewed the PMH, social history, FamilyHx, surgical history, allergies and medications documented / confirmed by the patient at the time of this visit.  Review of Systems   Constitutional:  Negative for activity change and unexpected weight change.   HENT:  Negative for hearing loss, rhinorrhea and trouble swallowing.    Eyes:  Negative for discharge and visual disturbance.   Respiratory:  Negative for chest tightness and wheezing.    Cardiovascular:  Negative for chest  "pain and palpitations.   Gastrointestinal:  Negative for blood in stool, constipation, diarrhea and vomiting.   Endocrine: Negative for polydipsia and polyuria.   Genitourinary:  Negative for difficulty urinating, hematuria and urgency.   Musculoskeletal:  Negative for arthralgias, joint swelling and neck pain.   Neurological:  Negative for weakness and headaches.   Psychiatric/Behavioral:  Negative for confusion and dysphoric mood.      Objective:   /88   Pulse 75   Ht 5' 8" (1.727 m)   Wt 111.9 kg (246 lb 12.8 oz)   SpO2 95%   BMI 37.53 kg/m²   Physical Exam  Vitals and nursing note reviewed.   Constitutional:       General: He is not in acute distress.     Appearance: He is well-developed. He is not diaphoretic.   HENT:      Head: Normocephalic and atraumatic.      Right Ear: External ear normal.      Left Ear: External ear normal.      Nose: Nose normal. No rhinorrhea.   Eyes:      Extraocular Movements: Extraocular movements intact.      Pupils: Pupils are equal, round, and reactive to light.   Cardiovascular:      Rate and Rhythm: Normal rate.      Pulses: Normal pulses.   Pulmonary:      Effort: Pulmonary effort is normal. No respiratory distress.      Breath sounds: Normal breath sounds.   Abdominal:      General: Bowel sounds are normal.      Palpations: Abdomen is soft.   Musculoskeletal:         General: Normal range of motion.      Cervical back: Normal range of motion and neck supple.   Lymphadenopathy:      Cervical: No cervical adenopathy.      Right cervical: No superficial, deep or posterior cervical adenopathy.     Left cervical: No superficial, deep or posterior cervical adenopathy.      Upper Body:      Right upper body: No supraclavicular, axillary, pectoral or epitrochlear adenopathy.      Left upper body: No supraclavicular, axillary, pectoral or epitrochlear adenopathy.   Skin:     General: Skin is warm and dry.      Capillary Refill: Capillary refill takes less than 2 seconds.     "  Findings: No rash.             Comments: Scar noted on the left shoulder that extends down to the axilla, this is from previous shoulder surgery, clean dry intact no tenderness   Neurological:      General: No focal deficit present.      Mental Status: He is alert and oriented to person, place, and time.      Cranial Nerves: No cranial nerve deficit.      Motor: No weakness.      Gait: Gait normal.   Psychiatric:         Attention and Perception: He is attentive.         Mood and Affect: Mood normal. Mood is not anxious or depressed. Affect is not labile, blunt, angry or inappropriate.         Speech: He is communicative. Speech is not rapid and pressured, delayed, slurred or tangential.         Behavior: Behavior normal. Behavior is not agitated, slowed, aggressive, withdrawn, hyperactive or combative.         Thought Content: Thought content normal. Thought content is not paranoid or delusional. Thought content does not include homicidal or suicidal ideation. Thought content does not include homicidal or suicidal plan.         Cognition and Memory: Memory is not impaired.         Judgment: Judgment normal. Judgment is not impulsive or inappropriate.     Physical Exam      Results  Laboratory Studies  White blood count is within normal range. Hemoglobin is less than 14.    Imaging  Low-dose CT scan shows bilateral renal cysts not significantly changed, benign left liver lobe cyst, and multiple renal and liver hypodensities, stable since prior exam.    Assessment:     1. Lymphadenopathy    2. Aortic atherosclerosis    3. Renal cyst    4. Pulmonary nodule      MDM:   Moderate medical complexity.  Moderate risk.  Total time: 21 minutes.  This includes total time spent on the encounter, which includes face to face time and non-face to face time preparing to see the patient (eg, review of previous medical records, tests), Obtaining and/or reviewing separately obtained history, documenting clinical information in the  electronic or other health record, independently interpreting results (not separately reported)/communicating results to the patient/family/caregiver, and/or care coordination (not separately reported).    I have Reviewed and summarized old records.  I have performed thorough medication reconciliation today and discussed risk and benefits of medications.  I have reviewed labs and discussed with patient.  All questions were answered.  I am requesting old records and will review them once they are available.  Visit today included increased complexity associated with the care of the episodic problem see above assessment addressed and managing the longitudinal care of the patient due to the serious and/or complex managed problem(s) see above.  I have signed for the following orders AND/OR meds.  Orders Placed This Encounter   Procedures    US Soft Tissue Axilla, Left     Standing Status:   Future     Number of Occurrences:   1     Standing Expiration Date:   4/26/2025     Order Specific Question:   May the Radiologist modify the order per protocol to meet the clinical needs of the patient?     Answer:   Yes     Order Specific Question:   Release to patient     Answer:   Immediate           Follow up if symptoms worsen or fail to improve.      If no improvement in symptoms or symptoms worsen, advised to call/follow-up at clinic or go to ER. Patient voiced understanding and all questions/concerns were addressed.   DISCLAIMER: This note was compiled by using a speech recognition dictation system and therefore please be aware that typographical / speech recognition errors can and do occur.  Please contact me if you see any errors specifically.  Consent was obtained for [employer]KE recording system prior to the visit.    Jared Vo M.D.       Office: [phone]   [address] [place] [address]  FAX: [phone]

## 2024-04-26 NOTE — PATIENT INSTRUCTIONS
Nitin Babb,     If you are due for any health screening(s) below please notify me so we can arrange them to be ordered and scheduled. Most healthy patients at your age complete them, but you are free to accept or refuse.     If you can't do it, I'll definitely understand. If you can, I'd certainly appreciate it!    All of your core healthy metrics are met.

## 2024-04-26 NOTE — ASSESSMENT & PLAN NOTE
Patient had a history of left shoulder replacement years ago.  He denies any recent infections.  He does not any pain or swelling in the under arm.  He does have chronic shoulder pain on the left.    I will check ultrasound of the left axilla for reassurance.    Physical exam reveals no lymphadenopathy.

## 2024-05-13 DIAGNOSIS — Z79.899 ENCOUNTER FOR LONG-TERM (CURRENT) USE OF MEDICATIONS: ICD-10-CM

## 2024-05-13 DIAGNOSIS — K21.00 GASTROESOPHAGEAL REFLUX DISEASE WITH ESOPHAGITIS WITHOUT HEMORRHAGE: ICD-10-CM

## 2024-05-14 RX ORDER — PANTOPRAZOLE SODIUM 20 MG/1
20 TABLET, DELAYED RELEASE ORAL DAILY
Qty: 90 TABLET | Refills: 3 | OUTPATIENT
Start: 2024-05-14

## 2024-05-14 NOTE — TELEPHONE ENCOUNTER
Refill Decision Note  Quick DC. Request already responded to by other means (e.g. phone or fax)    Skinny Del Cid  is requesting a refill authorization.  Brief Assessment and Rationale for Refill:  Quick Discontinue     Medication Therapy Plan:       Medication Reconciliation Completed: No   Comments:           Note composed:10:23 AM 05/14/2024

## 2024-05-14 NOTE — TELEPHONE ENCOUNTER
No care due was identified.  [facility] Embedded Care Due Messages. Reference number: [id number].   5/13/2024 7:01:01 PM CDT

## 2024-05-18 ENCOUNTER — PATIENT MESSAGE (OUTPATIENT)
Dept: FAMILY MEDICINE | Facility: CLINIC | Age: 69
End: 2024-05-18
Payer: MEDICARE

## 2024-06-22 DIAGNOSIS — Z79.899 ENCOUNTER FOR LONG-TERM (CURRENT) USE OF MEDICATIONS: ICD-10-CM

## 2024-06-22 DIAGNOSIS — F41.8 ANXIOUS DEPRESSION: ICD-10-CM

## 2024-06-22 RX ORDER — VENLAFAXINE HYDROCHLORIDE 37.5 MG/1
37.5 CAPSULE, EXTENDED RELEASE ORAL
Qty: 90 CAPSULE | Refills: 3 | Status: SHIPPED | OUTPATIENT
Start: 2024-06-22

## 2024-06-23 NOTE — TELEPHONE ENCOUNTER
Refill Decision Note   Skinnyjourdan Del Cid  is requesting a refill authorization.  Brief Assessment and Rationale for Refill:  Approve     Medication Therapy Plan:        Comments:     Note composed:8:48 PM 06/22/2024

## 2024-06-23 NOTE — TELEPHONE ENCOUNTER
No care due was identified.  [facility] Embedded Care Due Messages. Reference number: [id number].   6/22/2024 7:23:18 PM CDT

## 2024-07-02 ENCOUNTER — OFFICE VISIT (OUTPATIENT)
Dept: FAMILY MEDICINE | Facility: CLINIC | Age: 69
End: 2024-07-02
Payer: MEDICARE

## 2024-07-02 VITALS
WEIGHT: 241 LBS | SYSTOLIC BLOOD PRESSURE: 121 MMHG | BODY MASS INDEX: 37.83 KG/M2 | HEART RATE: 82 BPM | TEMPERATURE: 98 F | DIASTOLIC BLOOD PRESSURE: 81 MMHG | HEIGHT: 67 IN

## 2024-07-02 DIAGNOSIS — K76.89 LIVER CYST: Chronic | ICD-10-CM

## 2024-07-02 DIAGNOSIS — R41.3 MEMORY LOSS: ICD-10-CM

## 2024-07-02 DIAGNOSIS — K76.0 HEPATIC STEATOSIS: ICD-10-CM

## 2024-07-02 DIAGNOSIS — H91.93 BILATERAL HEARING LOSS, UNSPECIFIED HEARING LOSS TYPE: ICD-10-CM

## 2024-07-02 DIAGNOSIS — R91.1 PULMONARY NODULE: Chronic | ICD-10-CM

## 2024-07-02 DIAGNOSIS — I10 HYPERTENSION, ESSENTIAL: Chronic | ICD-10-CM

## 2024-07-02 DIAGNOSIS — I70.0 AORTIC ATHEROSCLEROSIS: ICD-10-CM

## 2024-07-02 DIAGNOSIS — Z00.00 ENCOUNTER FOR MEDICARE ANNUAL WELLNESS EXAM: Primary | ICD-10-CM

## 2024-07-02 DIAGNOSIS — F41.8 ANXIOUS DEPRESSION: ICD-10-CM

## 2024-07-02 DIAGNOSIS — N28.1 RENAL CYST: Chronic | ICD-10-CM

## 2024-07-02 DIAGNOSIS — G47.33 OSA ON CPAP: Chronic | ICD-10-CM

## 2024-07-02 DIAGNOSIS — K21.00 GASTROESOPHAGEAL REFLUX DISEASE WITH ESOPHAGITIS WITHOUT HEMORRHAGE: ICD-10-CM

## 2024-07-02 DIAGNOSIS — E66.2 CLASS 2 OBESITY WITH ALVEOLAR HYPOVENTILATION, SERIOUS COMORBIDITY, AND BODY MASS INDEX (BMI) OF 37.0 TO 37.9 IN ADULT: ICD-10-CM

## 2024-07-02 PROCEDURE — 1159F MED LIST DOCD IN RCRD: CPT | Mod: HCNC,CPTII,S$GLB, | Performed by: PHYSICIAN ASSISTANT

## 2024-07-02 PROCEDURE — 1158F ADVNC CARE PLAN TLK DOCD: CPT | Mod: HCNC,CPTII,S$GLB, | Performed by: PHYSICIAN ASSISTANT

## 2024-07-02 PROCEDURE — 1170F FXNL STATUS ASSESSED: CPT | Mod: HCNC,CPTII,S$GLB, | Performed by: PHYSICIAN ASSISTANT

## 2024-07-02 PROCEDURE — 99999 PR PBB SHADOW E&M-EST. PATIENT-LVL III: CPT | Mod: PBBFAC,HCNC,, | Performed by: PHYSICIAN ASSISTANT

## 2024-07-02 PROCEDURE — 3079F DIAST BP 80-89 MM HG: CPT | Mod: HCNC,CPTII,S$GLB, | Performed by: PHYSICIAN ASSISTANT

## 2024-07-02 PROCEDURE — 1126F AMNT PAIN NOTED NONE PRSNT: CPT | Mod: HCNC,CPTII,S$GLB, | Performed by: PHYSICIAN ASSISTANT

## 2024-07-02 PROCEDURE — 3044F HG A1C LEVEL LT 7.0%: CPT | Mod: HCNC,CPTII,S$GLB, | Performed by: PHYSICIAN ASSISTANT

## 2024-07-02 PROCEDURE — 1101F PT FALLS ASSESS-DOCD LE1/YR: CPT | Mod: HCNC,CPTII,S$GLB, | Performed by: PHYSICIAN ASSISTANT

## 2024-07-02 PROCEDURE — 1160F RVW MEDS BY RX/DR IN RCRD: CPT | Mod: HCNC,CPTII,S$GLB, | Performed by: PHYSICIAN ASSISTANT

## 2024-07-02 PROCEDURE — G0439 PPPS, SUBSEQ VISIT: HCPCS | Mod: HCNC,S$GLB,, | Performed by: PHYSICIAN ASSISTANT

## 2024-07-02 PROCEDURE — 3074F SYST BP LT 130 MM HG: CPT | Mod: HCNC,CPTII,S$GLB, | Performed by: PHYSICIAN ASSISTANT

## 2024-07-02 PROCEDURE — 3288F FALL RISK ASSESSMENT DOCD: CPT | Mod: HCNC,CPTII,S$GLB, | Performed by: PHYSICIAN ASSISTANT

## 2024-07-02 RX ORDER — KETOCONAZOLE 20 MG/ML
SHAMPOO, SUSPENSION TOPICAL
COMMUNITY
Start: 2024-06-17

## 2024-07-02 RX ORDER — FLUOROURACIL 50 MG/G
1 CREAM TOPICAL 2 TIMES DAILY
COMMUNITY
Start: 2024-02-26

## 2024-07-02 NOTE — PATIENT INSTRUCTIONS
Counseling and Referral of Other Preventative  (Italic type indicates deductible and co-insurance are waived)    Patient Name: Skinny Del Cid  Today's Date: 7/2/2024    Health Maintenance       Date Due Completion Date    TETANUS VACCINE Never done ---    Shingles Vaccine (1 of 2) Never done ---    RSV Vaccine (Age 60+ and Pregnant patients) (1 - 1-dose 60+ series) Never done ---    Hepatitis C Screening 07/07/2024 (Originally 1955) ---    High Dose Statin 04/12/2025 (Originally 8/1/1976) ---    COVID-19 Vaccine (7 - 2023-24 season) 04/12/2025 (Originally 9/1/2023) 11/4/2021    Influenza Vaccine (1) 09/01/2024 9/13/2023    PROSTATE-SPECIFIC ANTIGEN 04/16/2025 4/16/2024    Colorectal Cancer Screening 02/04/2027 2/4/2022    Hemoglobin A1c (Diabetic Prevention Screening) 04/16/2027 4/16/2024    Lipid Panel 04/16/2029 4/16/2024        No orders of the defined types were placed in this encounter.      The following information is provided to all patients.  This information is to help you find resources for any of the problems found today that may be affecting your health:                  Living healthy guide: www.[facility].[place].gov      Understanding Diabetes: www.diabetes.org      Eating healthy: www.cdc.gov/healthyweight      CDC home safety checklist: www.cdc.gov/steadi/patient.html      Agency on Aging: www.goea.[place].[facility]      Alcoholics anonymous (AA): www.aa.org      Physical Activity: www.fay.nih.gov/qw7tzmw      Tobacco use: www.quitwithusla.org

## 2024-07-02 NOTE — PROGRESS NOTES
"  Skinny Del Cid presented for a follow-up Medicare AWV today. The following components were reviewed and updated:    Medical history  Family History  Social history  Allergies and Current Medications  Health Risk Assessment  Health Maintenance  Care Team    **See Completed Assessments for Annual Wellness visit with in the encounter summary    The following assessments were completed:  Depression Screening  Cognitive function Screening  Timed Get Up Test  Whisper Test      Opioid documentation:      Patient does not have a current opioid prescription.          Vitals:    07/02/24 1305   BP: 121/81   Pulse: 82   Temp: 98.3 °F (36.8 °C)   Weight: 109.3 kg (241 lb)   Height: 5' 7" (1.702 m)     Body mass index is 37.75 kg/m².       Physical Exam  Constitutional:       General: He is not in acute distress.     Appearance: Normal appearance.   HENT:      Head: Normocephalic and atraumatic.   Eyes:      Pupils: Pupils are equal, round, and reactive to light.   Cardiovascular:      Rate and Rhythm: Normal rate and regular rhythm.      Pulses: Normal pulses.      Heart sounds: Normal heart sounds. No murmur heard.  Pulmonary:      Effort: Pulmonary effort is normal. No respiratory distress.      Breath sounds: Normal breath sounds.   Abdominal:      General: Bowel sounds are normal. There is no distension.      Palpations: Abdomen is soft.      Tenderness: There is no abdominal tenderness.   Musculoskeletal:         General: Normal range of motion.      Cervical back: Normal range of motion and neck supple.   Skin:     General: Skin is warm and dry.      Findings: No rash.   Neurological:      General: No focal deficit present.      Mental Status: He is alert and oriented to person, place, and time.   Psychiatric:         Mood and Affect: Mood normal.         Behavior: Behavior normal.           Diagnoses and health risks identified today and associated recommendations/orders:  1. Encounter for Medicare annual wellness " exam  Complete history and physical was completed today. Complete and thorough medication reconciliation was performed. Discussed risks and benefits of medications. Advised patient on orders and health maintenance. Continue current medications listed on your summary sheet. Discussed immunizations due.     2. Memory loss  Chronic. Stable  Continue current medications and plan of care per PCP and specialists     3. Anxious depression  Chronic. Stable  Remains on Effexor  Continue current medications and plan of care per PCP and specialists     4. Bilateral hearing loss, unspecified hearing loss type  Chronic. Stable  Continue current medications and plan of care per PCP and specialists     5. Pulmonary nodule  Chronic. Stable  Noted on previous CT chest  Repeat CT chest in 2 years for surveillance (2026) - if stable no further imaging needed (per pulm)  Followed by pulmonology  Continue current medications and plan of care per PCP and specialists     6. Hypertension, essential  Chronic. Stable  BP at goal today  Continue lifestyle modification with low sodium diet and exercise   Continue current medications and plan of care per PCP and specialists   Hypertension Medications               amLODIPine (NORVASC) 5 MG tablet amlodipine 5 mg tablet  TAKE 1 TABLET BY MOUTH EVERY DAY Strength: 5 mg    losartan-hydrochlorothiazide 100-25 mg (HYZAAR) 100-25 mg per tablet Take 1 tablet by mouth once daily.     7. Aortic atherosclerosis  Chronic. Stable  Noted on previous imaging  Continue current medications and plan of care per PCP and specialists     8. Renal cyst  Chronic. Stable  Noted on previous imaging  Continue current medications and plan of care per PCP and specialists     9. Class 2 obesity with alveolar hypoventilation, serious comorbidity, and body mass index (BMI) of 37.0 to 37.9 in adult  Chronic. Stable  General weight loss/lifestyle modification strategies discussed (elicit support from others; identify  saboteurs; non-food rewards, etc).  Informal exercise measures discussed, e.g. taking stairs instead of elevator.  Regular aerobic exercise program discussed.  Continue current medications and plan of care per PCP and specialists     10. Liver cyst  Chronic. Stable  Noted on previous imaging  Continue current medications and plan of care per PCP and specialists     11. Gastroesophageal reflux disease with esophagitis without hemorrhage  Chronic. Stable  Remains on daily PPI  Continue lifestyle modification with avoidance of acidic foods, caffeine, late night eating  Continue current medications and plan of care per PCP and specialists     12. Hepatic steatosis  Chronic. Stable  Noted on previous imaging  Recommend low fat, low cholesterol diet, maintain good control of blood sugars and cholesterol levels, exercise, weight loss (if overweight), minimize/avoid alcohol and tylenol products  Continue current medications and plan of care per PCP and specialists     13. MARK on CPAP  Chronic. Stable  Compliant with CPAP  Continue current medications and plan of care per PCP and specialists       Provided Skinny with a 5-10 year written screening schedule and personal prevention plan. Recommendations were developed using the USPSTF age appropriate recommendations. Education, counseling, and referrals were provided as needed.  After Visit Summary printed and given to patient which includes a list of additional screenings\tests needed.    Follow up in about 6 months (around 1/2/2025), or if symptoms worsen or fail to improve.      Meg Anguiano PA-C      I offered to discuss advanced care planning, including how to pick a person who would make decisions for you if you were unable to make them for yourself, called a health care power of , and what kind of decisions you might make such as use of life sustaining treatments such as ventilators and tube feeding when faced with a life limiting illness recorded on a  living will that they will need to know. (How you want to be cared for as you near the end of your natural life)     X Patient is interested in learning more about how to make advanced directives.  I provided them paperwork and offered to discuss this with them.

## 2024-09-11 ENCOUNTER — PATIENT MESSAGE (OUTPATIENT)
Dept: FAMILY MEDICINE | Facility: CLINIC | Age: 69
End: 2024-09-11
Payer: MEDICARE

## 2025-04-26 DIAGNOSIS — F41.8 ANXIOUS DEPRESSION: ICD-10-CM

## 2025-04-26 DIAGNOSIS — I10 HYPERTENSION, ESSENTIAL: ICD-10-CM

## 2025-04-26 DIAGNOSIS — Z79.899 ENCOUNTER FOR LONG-TERM (CURRENT) USE OF MEDICATIONS: ICD-10-CM

## 2025-04-26 RX ORDER — VENLAFAXINE HYDROCHLORIDE 37.5 MG/1
37.5 CAPSULE, EXTENDED RELEASE ORAL
Qty: 90 CAPSULE | Refills: 0 | Status: SHIPPED | OUTPATIENT
Start: 2025-04-26

## 2025-04-26 RX ORDER — AMLODIPINE BESYLATE 5 MG/1
5 TABLET ORAL
Qty: 90 TABLET | Refills: 0 | Status: SHIPPED | OUTPATIENT
Start: 2025-04-26

## 2025-04-26 NOTE — TELEPHONE ENCOUNTER
Refill Routing Note   Medication(s) are not appropriate for processing by [facility] for the following reason(s):        Required labs outdated    [facility] action(s):  Approve  Defer   Requires labs : Yes             Appointments  past 12m or future 3m with PCP    Date Provider   Last Visit   4/26/2024 Jared Vo MD   Next Visit   5/22/2025 Jared Vo MD   ED visits in past 90 days: 0        Note composed:12:13 PM 04/26/2025

## 2025-04-26 NOTE — TELEPHONE ENCOUNTER
Care Due:                  Date            Visit Type   Department     Provider  --------------------------------------------------------------------------------                                EP -                              PRIMARY      [facility]  Last Visit: 04-      CARE ([facility])   MEDICINE       Jared GREENE                              ANNUAL                              CHECKUP/PHY  [facility]  Next Visit: 05-      S            MEDICINE       Jared Vo                                                            Last  Test          Frequency    Reason                     Performed    Due Date  --------------------------------------------------------------------------------    CMP.........  12 months..  losartan-hydrochlorothiaz  04- 04-                             michelle, venlafaxine.........    [employer] Embedded Care Due Messages. Reference number: [id number].   4/26/2025 1:32:35 AM CDT

## 2025-05-21 ENCOUNTER — LAB VISIT (OUTPATIENT)
Dept: LAB | Facility: HOSPITAL | Age: 70
End: 2025-05-21
Attending: FAMILY MEDICINE
Payer: MEDICARE

## 2025-05-21 ENCOUNTER — OFFICE VISIT (OUTPATIENT)
Dept: FAMILY MEDICINE | Facility: CLINIC | Age: 70
End: 2025-05-21
Payer: MEDICARE

## 2025-05-21 VITALS
SYSTOLIC BLOOD PRESSURE: 132 MMHG | OXYGEN SATURATION: 95 % | HEART RATE: 73 BPM | RESPIRATION RATE: 17 BRPM | BODY MASS INDEX: 38.64 KG/M2 | DIASTOLIC BLOOD PRESSURE: 76 MMHG | WEIGHT: 246.19 LBS | TEMPERATURE: 98 F | HEIGHT: 67 IN

## 2025-05-21 DIAGNOSIS — R91.1 PULMONARY NODULE: Chronic | ICD-10-CM

## 2025-05-21 DIAGNOSIS — Z00.00 ENCOUNTER FOR MEDICAL EXAMINATION TO ESTABLISH CARE: ICD-10-CM

## 2025-05-21 DIAGNOSIS — G47.33 OSA ON CPAP: Chronic | ICD-10-CM

## 2025-05-21 DIAGNOSIS — I10 HYPERTENSION, ESSENTIAL: Primary | ICD-10-CM

## 2025-05-21 DIAGNOSIS — E66.812 CLASS 2 OBESITY WITH ALVEOLAR HYPOVENTILATION, SERIOUS COMORBIDITY, AND BODY MASS INDEX (BMI) OF 36.0 TO 36.9 IN ADULT: ICD-10-CM

## 2025-05-21 DIAGNOSIS — R41.3 MEMORY LOSS: ICD-10-CM

## 2025-05-21 DIAGNOSIS — T75.3XXA MOTION SICKNESS, INITIAL ENCOUNTER: ICD-10-CM

## 2025-05-21 DIAGNOSIS — F41.8 ANXIOUS DEPRESSION: ICD-10-CM

## 2025-05-21 DIAGNOSIS — Z79.899 ENCOUNTER FOR LONG-TERM (CURRENT) USE OF MEDICATIONS: ICD-10-CM

## 2025-05-21 DIAGNOSIS — E66.2 CLASS 2 OBESITY WITH ALVEOLAR HYPOVENTILATION, SERIOUS COMORBIDITY, AND BODY MASS INDEX (BMI) OF 36.0 TO 36.9 IN ADULT: ICD-10-CM

## 2025-05-21 DIAGNOSIS — Z13.220 ENCOUNTER FOR LIPID SCREENING FOR CARDIOVASCULAR DISEASE: ICD-10-CM

## 2025-05-21 DIAGNOSIS — Z85.828 HISTORY OF SKIN CANCER: ICD-10-CM

## 2025-05-21 DIAGNOSIS — K21.00 GASTROESOPHAGEAL REFLUX DISEASE WITH ESOPHAGITIS WITHOUT HEMORRHAGE: ICD-10-CM

## 2025-05-21 DIAGNOSIS — Z12.5 ENCOUNTER FOR PROSTATE CANCER SCREENING: ICD-10-CM

## 2025-05-21 DIAGNOSIS — Z13.6 ENCOUNTER FOR LIPID SCREENING FOR CARDIOVASCULAR DISEASE: ICD-10-CM

## 2025-05-21 PROBLEM — H61.23 BILATERAL IMPACTED CERUMEN: Status: RESOLVED | Noted: 2017-01-19 | Resolved: 2025-05-21

## 2025-05-21 PROBLEM — H91.93 HEARING LOSS, BILATERAL: Status: RESOLVED | Noted: 2017-01-19 | Resolved: 2025-05-21

## 2025-05-21 PROBLEM — K11.5 SALIVARY GLAND STONE: Status: RESOLVED | Noted: 2017-01-19 | Resolved: 2025-05-21

## 2025-05-21 LAB
ALBUMIN SERPL BCP-MCNC: 3.7 G/DL (ref 3.5–5.2)
ALP SERPL-CCNC: 50 UNIT/L (ref 40–150)
ALT SERPL W/O P-5'-P-CCNC: 21 UNIT/L (ref 10–44)
ANION GAP (OHS): 8 MMOL/L (ref 8–16)
AST SERPL-CCNC: 21 UNIT/L (ref 11–45)
BILIRUB SERPL-MCNC: 0.5 MG/DL (ref 0.1–1)
BUN SERPL-MCNC: 13 MG/DL (ref 8–23)
CALCIUM SERPL-MCNC: 9.7 MG/DL (ref 8.7–10.5)
CHLORIDE SERPL-SCNC: 105 MMOL/L (ref 95–110)
CHOLEST SERPL-MCNC: 177 MG/DL (ref 120–199)
CHOLEST/HDLC SERPL: 4.7 {RATIO} (ref 2–5)
CO2 SERPL-SCNC: 28 MMOL/L (ref 23–29)
CREAT SERPL-MCNC: 0.9 MG/DL (ref 0.5–1.4)
GFR SERPLBLD CREATININE-BSD FMLA CKD-EPI: >60 ML/MIN/1.73/M2
GLUCOSE SERPL-MCNC: 107 MG/DL (ref 70–110)
HDLC SERPL-MCNC: 38 MG/DL (ref 40–75)
HDLC SERPL: 21.5 % (ref 20–50)
HGB BLD-MCNC: 14.6 GM/DL (ref 14–18)
LDLC SERPL CALC-MCNC: 114.8 MG/DL (ref 63–159)
NONHDLC SERPL-MCNC: 139 MG/DL
POTASSIUM SERPL-SCNC: 4.7 MMOL/L (ref 3.5–5.1)
PROT SERPL-MCNC: 6.9 GM/DL (ref 6–8.4)
PSA SERPL-MCNC: 0.64 NG/ML
SODIUM SERPL-SCNC: 141 MMOL/L (ref 136–145)
TRIGL SERPL-MCNC: 121 MG/DL (ref 30–150)
TSH SERPL-ACNC: 0.99 UIU/ML (ref 0.4–4)

## 2025-05-21 PROCEDURE — 84443 ASSAY THYROID STIM HORMONE: CPT | Mod: HCNC

## 2025-05-21 PROCEDURE — 84153 ASSAY OF PSA TOTAL: CPT | Mod: HCNC

## 2025-05-21 PROCEDURE — 80053 COMPREHEN METABOLIC PANEL: CPT | Mod: HCNC

## 2025-05-21 PROCEDURE — 85018 HEMOGLOBIN: CPT | Mod: HCNC,PO

## 2025-05-21 PROCEDURE — 80061 LIPID PANEL: CPT | Mod: HCNC

## 2025-05-21 PROCEDURE — 99999 PR PBB SHADOW E&M-EST. PATIENT-LVL IV: CPT | Mod: PBBFAC,HCNC,, | Performed by: NURSE PRACTITIONER

## 2025-05-21 PROCEDURE — 36415 COLL VENOUS BLD VENIPUNCTURE: CPT | Mod: HCNC,PO

## 2025-05-21 RX ORDER — PANTOPRAZOLE SODIUM 20 MG/1
20 TABLET, DELAYED RELEASE ORAL
Qty: 90 TABLET | Refills: 0 | Status: SHIPPED | OUTPATIENT
Start: 2025-05-21 | End: 2025-05-21 | Stop reason: SDUPTHER

## 2025-05-21 RX ORDER — VENLAFAXINE HYDROCHLORIDE 37.5 MG/1
37.5 CAPSULE, EXTENDED RELEASE ORAL DAILY
Qty: 90 CAPSULE | Refills: 4 | Status: SHIPPED | OUTPATIENT
Start: 2025-05-21

## 2025-05-21 RX ORDER — LOSARTAN POTASSIUM AND HYDROCHLOROTHIAZIDE 25; 100 MG/1; MG/1
1 TABLET ORAL DAILY
Qty: 90 TABLET | Refills: 4 | Status: SHIPPED | OUTPATIENT
Start: 2025-05-21

## 2025-05-21 RX ORDER — AMLODIPINE BESYLATE 5 MG/1
5 TABLET ORAL DAILY
Qty: 90 TABLET | Refills: 4 | Status: SHIPPED | OUTPATIENT
Start: 2025-05-21

## 2025-05-21 RX ORDER — SCOPOLAMINE 1 MG/3D
1 PATCH, EXTENDED RELEASE TRANSDERMAL
Qty: 10 PATCH | Refills: 0 | Status: SHIPPED | OUTPATIENT
Start: 2025-05-21 | End: 2025-06-20

## 2025-05-21 RX ORDER — PANTOPRAZOLE SODIUM 20 MG/1
20 TABLET, DELAYED RELEASE ORAL DAILY
Qty: 90 TABLET | Refills: 4 | Status: SHIPPED | OUTPATIENT
Start: 2025-05-21

## 2025-05-21 NOTE — PROGRESS NOTES
Assessment/Plan:    1. Hypertension, essential  Overview:  CHRONIC. STABLE. BP Reviewed.  Compliant with BP medications. No SE reported.   (-) CP, SOB, palpitations, dizziness, lightheadedness, HA, arm numbness, tingling or weakness, syncope.    Hypertension Medications              amLODIPine (NORVASC) 5 MG tablet Take 1 tablet (5 mg total) by mouth once daily.    losartan-hydrochlorothiazide 100-25 mg (HYZAAR) 100-25 mg per tablet Take 1 tablet by mouth once daily.     Creatinine   Date Value Ref Range Status   04/16/2024 0.9 0.5 - 1.4 mg/dL Final     Results for orders placed or performed in visit on 01/31/22   IN OFFICE EKG 12-LEAD (to COTA Track)    Collection Time: 01/31/22 11:18 AM    Narrative    Test Reason : Z13.6,    Vent. Rate : 074 BPM     Atrial Rate : 074 BPM     P-R Int : 178 ms          QRS Dur : 094 ms      QT Int : 384 ms       P-R-T Axes : 012 046 017 degrees     QTc Int : 426 ms    Normal sinus rhythm  Normal ECG  No previous ECGs available  Confirmed by MARLO FATIMA, DONNA SONI ([id number]) on 2/1/2022 8:20:16 PM    Referred By: MD MILAN           Confirmed By:DONNA SELLERS MD         Orders:  -     amLODIPine (NORVASC) 5 MG tablet; Take 1 tablet (5 mg total) by mouth once daily.  Dispense: 90 tablet; Refill: 4  -     losartan-hydrochlorothiazide 100-25 mg (HYZAAR) 100-25 mg per tablet; Take 1 tablet by mouth once daily.  Dispense: 90 tablet; Refill: 4    2. Gastroesophageal reflux disease with esophagitis without hemorrhage  Overview:  Chronic.  Stable.  Reports compliance with pantoprazole 20 milligrams daily.  No side effects reported.  Symptoms are controlled.     Assessment & Plan:  Continue protonix. Please be advised of condition course.  - Take PPI in the morning 30-60 minutes before breakfast  - I recommend ongoing Education for lifestyle modifications to help control/reduce reflux/abdominal pain including: avoid large meals, avoid eating within 2-3 hours of bedtime (avoid late night eating &  lying down soon after eating), elevate head of bed if nocturnal symptoms are present, smoking cessation (if current smoker), & weight loss (if overweight).   - please be advised to avoid known foods which trigger reflux symptoms & to minimize/avoid high-fat foods, chocolate, caffeine, citrus, alcohol, & tomato products.  - Advised to avoid/limit use of NSAID's, since they can cause GI upset, bleeding, and/or ulcers. If needed, take with food.     Orders:  -     pantoprazole (PROTONIX) 20 MG tablet; Take 1 tablet (20 mg total) by mouth once daily.  Dispense: 90 tablet; Refill: 4    3. Anxious depression  Overview:  Chronic.  Stable.  Reports compliance.  No side effects reported.  Symptoms are controlled. Patient doing well on Effexor 37.5 milligrams daily.     Assessment & Plan:  Continue Effexor.Please be advised of condition course.  SNRI/SSRI is first-line treatment for this condition.  Please be advised of the risk of discontinuing this medication without tapering/contacting MD.  Patient has been advised of side effects, and all questions were answered.  Patient voiced understanding.  Patient will follow up routinely and notify us if having any side effects or worsening or persistent symptoms.  ER precautions were given. Antidepressant/Antianxiety Medication Initiation:  Patient informed of risks, benefits, and potential side effects of medication and accepts informed consent.  Common side effects include nausea, fatigue, headache, insomnia, etc see medication insert for complete side effect profile.  Most importantly be advised of the possibility of new or worsening suicidal thoughts/depression/anxiety etcetera.  Please be advised to stop the medication immediately and seek urgent treatment if this occurs.  Therefore please do not to abruptly discontinue medication without physician guidance except in cases of sudden onset or worsening of SI.     Orders:  -     venlafaxine (EFFEXOR-XR) 37.5 MG 24 hr capsule;  Take 1 capsule (37.5 mg total) by mouth once daily.  Dispense: 90 capsule; Refill: 4    4. Class 2 obesity with alveolar hypoventilation, serious comorbidity, and body mass index (BMI) of 36.0 to 36.9 in adult  Overview:  BMI Readings from Last 10 Encounters:   05/21/25 38.56 kg/m²   07/02/24 37.75 kg/m²   04/26/24 37.53 kg/m²   04/23/24 38.32 kg/m²   04/12/24 37.69 kg/m²   06/29/23 36.34 kg/m²   03/29/23 36.49 kg/m²   01/27/23 35.73 kg/m²   11/29/22 35.88 kg/m²   09/19/22 37.68 kg/m²       Assessment & Plan:  Continue lifestyle modification with diet and exercise.  Monitoring BMI.      5. Motion sickness, initial encounter  Assessment & Plan:  He has some vacations this Summer and gets motion sick. He is requesting Scopolamine patches which work well for him.     Orders:  -     scopolamine (TRANSDERM-SCOP) 1.3-1.5 mg (1 mg over 3 days); Place 1 patch onto the skin every 72 hours.  Dispense: 10 patch; Refill: 0    6. History of skin cancer  Overview:  Chronic.  Patient has multiple skin lesions on his head and neck and face.  He reports history of skin cancer. He is followed by dermatology.     Assessment & Plan:  Continue following with dermatology.       7. MARK on CPAP  Overview:  Jan 2023: Patient has download information to review. Doing well on current machine and mask. He is benefiting from the sleep therapy equipment.     Prev hist 2011, AHI 20, desat to 82%. CPAP 6 cm effective  Home sleep study ([facility]) 9/7/22: AHI 36, desats to 83%)    Home Sleep Studies     Date/Time: 9/9/2022 8:00 AM  Performed by: Yuval Peña MD  Authorized by: Jared Vo MD      PHYSICIAN INTERPRETATION AND COMMENTS: Findings are consistent with severe obstructive sleep apnea (MARK). CPAP  indicated. Please refer to sleep disorders for prompt attention  CLINICAL HISTORY: 67 year old male presented with: 16 inch neck, BMI of 36.6, an [place] sleepiness score of 3, history of  hypertension, a previous diagnosis of MARK  and symptoms of nocturnal snoring, waking up choking and witnessed apneas.  Based on the clinical history, the patient has a high pre-test probability of having Severe MARK.  SLEEP STUDY FINDINGS: Patient underwent a 1 night Home Sleep Test and by behavioral criteria, slept for approximately  6.44 hours, with a sleep latency of 1 minutes and a sleep efficiency of 92%. Severe sleep disordered breathing (AHI=36) is  noted based on a 4% hypopnea desaturation criteria. When considering more subtle measures of sleep disordered  breathing, the overall respiratory disturbance index is severe(RDI=48) based on a 1% hypopnea desaturation criteria with  confirmation by surrogate arousal indicators. The apneas/hypopneas are accompanied by minimal oxygen desaturation  (percent time below 90% SpO2: 2.4%, Min SpO2: 83.1%). The average desaturation across all sleep disordered breathing  events is 4.4%. Snoring occurs for 29.2% (30 dB) of the study. The mean pulse rate is 54.7 BPM, with frequent pulse rate  variability (52 events with >= 6 BPM increase/decrease per hour).  TREATMENT CONSIDERATIONS: Consider nasal continuous positive airway pressure (CPAP/AutoPAP) as the initial  treatment choice for Severe obstructive sleep apnea. A mandibular advancement splint (MAS) or referral to an ENT  surgeon for modification to the airway should be considered to reduce the risk of mortality caused by Severe MARK if the  patient prefers an alternative therapy or the CPAP trial is unsuccessful.    Assessment & Plan:  Continue CPAP therapy. Discussed sleep apnea condition course.      8. Pulmonary nodule  Overview:  CT Chest Without Contrast  Narrative & Impression  EXAMINATION:  CT CHEST WITHOUT CONTRAST     CLINICAL HISTORY:  Lung nodule, > 8mm; Solitary pulmonary nodule     TECHNIQUE:  Low dose axial images, sagittal and coronal reformations were obtained from the thoracic inlet to the lung bases. Contrast was not administered.      COMPARISON:  09/09/2022     FINDINGS:  Fat haziness along with an increased number of lymph nodes is noted within the left axilla.  This spans 8.3 by 4.3 cm and has intervening fat.  This could relate to an inflammatory process such as a cellulitis.  This would be an unusual appearance for liposarcoma given that it does not appear to be displacing the axillary nodes but surrounding them.  Infectious inflammatory and neoplastic etiologies are not excluded.  Postprocedural changes could appear similar.  Clinical correlation is necessary.     A defect is noted within the left humeral head suggestive of a bone cyst measuring 3.0 by 1.5 cm.  This is likely unchanged since the prior given changes in slice positioning.  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  Surgical anchors are noted within the left scapula near the glenoid similar to on the prior     Mild atherosclerotic changes are noted within the aortic arch.  Mild coronary calcifications are noted increasing the patient's coronary risk.  A large hypodensity is noted in the left lobe of the liver of 5.2 cm unchanged since the prior without worrisome characteristics statistically favored relate to a cyst.  A similar hypodensity is noted near the hepatic confluence unchanged since the prior suggestive a cyst of 11 mm surgical clips are noted suggestive cholecystectomy.  Three left renal hypodensities are noted within our field of view similar to on the prior suggestive of cysts the largest of 5.7 cm without worrisome characteristics.  A single right renal hypodensity is noted within our field of view similar to on the prior statistically favored relate to a cyst as well.  The spleen is not visualized consistent with splenectomy with 2 nodules noted in the left upper quadrant of the abdomen suggestive accessory splenules.     A 4 mm pulmonary nodule is noted at the left lung base image 286 sequence 4 stable since the  "prior     A mass is noted along the minor fissure on the right 1 cm in size unchanged since the prior image 234 sequence 4.     A 4 mm nodule is noted left upper lobe image 149 sequence 4 stable since the prior     A stable 3 mm nodule is noted left upper lobe image 204 sequence 4     A stable lingular nodular is noted of 4 mm image 336 sequence 4     Impression:     1. Interval detrimental change with fat haziness in the left axilla and multiple lymph nodes more numerous than expected.  This most likely relates to an inflammatory process such as a cellulitis or infection.  Neoplastic or inflammatory disease is not excluded, clinical correlation is necessary.  Follow-up for resolution may be of use.  Evaluation of the axillary nodes with ultrasound may add additional information.  2. Multiple pulmonary nodules stable since the prior the largest of 10 mm unchanged favoring a benign etiology.  3. Multiple renal and hepatic hypodensities stable since the prior suggestive of cysts     Electronically signed by:Salvatore Mendoza MD  Date:                                            04/18/2024  Time:                                           14:11       Assessment & Plan:  Per pulmonology, "Stable pulmonary nodules for at least 1.5 years, but likely back to 2020. Nonsmoker, likely benign. RTC 2 years with f/u CT. If stable, then no further CT's". Left axillary abnormality (stable on ultrasound).      9. Encounter for long-term (current) use of medications  Overview:  CHRONIC. Stable. Compliant with medications for managed conditions. See medication list. No SE reported. Routine lab analysis is being monitored. Refills were addressed. Reviewed labs.     Lab Results   Component Value Date    WBC 9.33 04/16/2024    HGB 14.6 05/21/2025    HCT 43.5 04/16/2024    MCV 89 04/16/2024     04/16/2024       Chemistry        Component Value Date/Time     04/16/2024 0712    K 4.3 04/16/2024 0712     04/16/2024 0712    CO2 " 27 04/16/2024 0712    BUN 12 04/16/2024 0712    CREATININE 0.9 04/16/2024 0712    GLU 95 04/16/2024 0712        Component Value Date/Time    CALCIUM 10.1 02/07/2022 0805    ALKPHOS 49 (L) 02/07/2022 0805    AST 21 02/07/2022 0805    ALT 28 02/07/2022 0805    BILITOT 0.4 02/07/2022 0805    ESTGFRAFRICA >60.0 02/07/2022 0805    EGFRNONAA >60.0 02/07/2022 0805        Lab Results   Component Value Date    TSH 0.958 04/16/2024       Assessment & Plan:  He has labs this morning- results pending. Complete history and physical was completed today.  Complete and thorough medication reconciliation was performed.  Discussed risks and benefits of medications.  Advised patient on orders and health maintenance.  We discussed old records and old labs if available.  Will request any records not available through epic.  Continue current medications listed on your summary sheet.    Orders:  -     amLODIPine (NORVASC) 5 MG tablet; Take 1 tablet (5 mg total) by mouth once daily.  Dispense: 90 tablet; Refill: 4  -     losartan-hydrochlorothiazide 100-25 mg (HYZAAR) 100-25 mg per tablet; Take 1 tablet by mouth once daily.  Dispense: 90 tablet; Refill: 4  -     pantoprazole (PROTONIX) 20 MG tablet; Take 1 tablet (20 mg total) by mouth once daily.  Dispense: 90 tablet; Refill: 4  -     venlafaxine (EFFEXOR-XR) 37.5 MG 24 hr capsule; Take 1 capsule (37.5 mg total) by mouth once daily.  Dispense: 90 capsule; Refill: 4    Follow up in about 6 months (around 11/21/2025), or if symptoms worsen or fail to improve.  ER precautions for severe or worsening symptoms.     Sydnee Walker NP  _____________________________________________________________________________________________________________________________________________________    CC: follow up     HPI: Patient is a 69-year-old male who presents in clinic today as an established patient here for follow up.    CURRENT MEDICATIONS:  He takes amlodipine, losartan with hydrochlorothiazide,  Protonix, and Effexor.     HTN: The patient is currently being treated for essential hypertension. This condition is chronic and stable. The patient is tolerating their medication well with good compliance.  Denies any adverse effects of medications.  Counseling was offered regarding low sodium diet.  The patient has a reduced salt intake. Routine exercise recommended. The patient denies headache, vision changes, chest pain, palpitations, shortness of breath, or lower extremity edema.    GERD: stable with daily protonix    ANXIETY/DEPRESSION: mood stable w/ effexor. Denies SIHI. No SE reported.    MEDICAL HISTORY:  He reports a history of skin cancer with previous lesions on his nose and top of head. He follows up with Dr. Byrd in [place] every 6 months for skin checks and denies any new concerns.    He has some upcoming trips this Summer and get motion sick. He is requesting Scopolamine patches which work well for him.    REVIEW OF SYSTEMS:  He denies chest pain, shortness of breath, leg swelling, and GI/ issues.    FAMILY HISTORY:  His father passed away at age 92 in January 2024 from Parkinson's disease. His mother passed away at age 89 in June 2023.    Other chronic conditions have been reviewed and remains stable. Further details as stated above.     Past Medical History:  Past Medical History:   Diagnosis Date    Hypertension     Lung nodule     MARK (obstructive sleep apnea)     Skin cancer      Past Surgical History:   Procedure Laterality Date    CHOLECYSTECTOMY  1998    GALLBLADDER SURGERY      HERNIA REPAIR  1989    SHOULDER SURGERY       Review of patient's allergies indicates:  No Known Allergies  Social History[1]  Family History   Problem Relation Name Age of Onset    Arthritis Mother Charisse Suh     Hearing loss Mother Charisse Suh     Heart disease Mother Charisse Suh     Hypertension Mother Charisse Suh     Alcohol abuse Father Sky     Diabetes Father Sky   "    Medications Ordered Prior to Encounter[2]    Review of Systems   Constitutional:  Negative for appetite change, chills, fatigue and fever.   HENT:  Negative for congestion, rhinorrhea and sore throat.    Eyes:  Negative for visual disturbance.   Respiratory:  Negative for cough and shortness of breath.    Cardiovascular:  Negative for chest pain, palpitations and leg swelling.   Gastrointestinal:  Negative for abdominal pain, diarrhea and vomiting.   Genitourinary:  Negative for difficulty urinating, dysuria and hematuria.   Musculoskeletal:  Negative for arthralgias and myalgias.   Skin:  Negative for rash and wound.   Neurological:  Negative for dizziness and headaches.   Psychiatric/Behavioral:  Negative for behavioral problems. The patient is not nervous/anxious.      Vitals:    05/21/25 1409   BP: 132/76   Pulse: 73   Resp: 17   Temp: 98.1 °F (36.7 °C)   TempSrc: Oral   SpO2: 95%   Weight: 111.7 kg (246 lb 3.2 oz)   Height: 5' 7" (1.702 m)     Wt Readings from Last 3 Encounters:   05/21/25 111.7 kg (246 lb 3.2 oz)   07/02/24 109.3 kg (241 lb)   04/26/24 111.9 kg (246 lb 12.8 oz)     Physical Exam  Vitals reviewed.   Constitutional:       General: He is not in acute distress.     Appearance: Normal appearance. He is not ill-appearing.   HENT:      Head: Normocephalic and atraumatic.      Right Ear: External ear normal.      Left Ear: External ear normal.      Nose: Nose normal.   Eyes:      Extraocular Movements: Extraocular movements intact.      Conjunctiva/sclera: Conjunctivae normal.   Cardiovascular:      Rate and Rhythm: Normal rate.      Heart sounds: Normal heart sounds.   Pulmonary:      Effort: Pulmonary effort is normal. No respiratory distress.      Breath sounds: Normal breath sounds.   Abdominal:      General: Abdomen is flat. There is no distension.   Musculoskeletal:         General: Normal range of motion.      Cervical back: Normal range of motion.      Right lower leg: No edema.      Left " lower leg: No edema.   Skin:     General: Skin is warm and dry.      Capillary Refill: Capillary refill takes less than 2 seconds.      Coloration: Skin is not pale.      Findings: No rash.   Neurological:      General: No focal deficit present.      Mental Status: He is alert and oriented to person, place, and time. Mental status is at baseline.      Motor: No weakness.      Gait: Gait normal.   Psychiatric:         Attention and Perception: He is attentive.         Mood and Affect: Mood normal. Mood is not anxious, depressed or elated. Affect is not labile, blunt, flat, angry or tearful.         Speech: Speech normal. He is communicative. Speech is not rapid and pressured, delayed or slurred.         Behavior: Behavior normal. Behavior is not agitated, slowed, aggressive, withdrawn, hyperactive or combative. Behavior is cooperative.         Thought Content: Thought content normal.         Judgment: Judgment normal.       Health Maintenance   Topic Date Due    Hepatitis C Screening  Never done    TETANUS VACCINE  Never done    High Dose Statin  Never done    RSV Vaccine (Age 60+ and Pregnant patients) (1 - Risk 60-74 years 1-dose series) Never done    COVID-19 Vaccine (7 - 2024-25 season) 09/01/2024    PROSTATE-SPECIFIC ANTIGEN  04/16/2025    Colorectal Cancer Screening  02/04/2027    Hemoglobin A1c (Diabetic Prevention Screening)  04/16/2027    Lipid Panel  04/16/2029    Shingles Vaccine  Completed    Influenza Vaccine  Completed    Pneumococcal Vaccines (Age 50+)  Completed     This note was generated with the assistance of ambient listening technology. Verbal consent was obtained by the patient and accompanying visitor(s) for the recording of patient appointment to facilitate this note. I attest to having reviewed and edited the generated note for accuracy, though some syntax or spelling errors may persist. Please contact the author of this note for any clarification.    Visit today included increased complexity  associated with the care of the episodic problem - see above- addressed and managing the longitudinal care of the patient due to the serious and/or complex managed problem(s) - see above.         [1]   Social History  Tobacco Use    Smoking status: Never     Passive exposure: Never    Smokeless tobacco: Never   Substance Use Topics    Alcohol use: Never    Drug use: Never   [2]   Current Outpatient Medications on File Prior to Visit   Medication Sig Dispense Refill    fluorouraciL (EFUDEX) 5 % cream Apply 1 application  topically 2 (two) times daily.      ketoconazole (NIZORAL) 2 % shampoo Apply topically.      [DISCONTINUED] amLODIPine (NORVASC) 5 MG tablet TAKE 1 TABLET EVERY DAY 90 tablet 0    [DISCONTINUED] losartan-hydrochlorothiazide 100-25 mg (HYZAAR) 100-25 mg per tablet Take 1 tablet by mouth once daily. 90 tablet 4    [DISCONTINUED] venlafaxine (EFFEXOR-XR) 37.5 MG 24 hr capsule TAKE 1 CAPSULE EVERY DAY 90 capsule 0    [DISCONTINUED] pantoprazole (PROTONIX) 20 MG tablet Take 1 tablet (20 mg total) by mouth once daily. 90 tablet 4     No current facility-administered medications on file prior to visit.

## 2025-05-21 NOTE — ASSESSMENT & PLAN NOTE
He has labs this morning- results pending. Complete history and physical was completed today.  Complete and thorough medication reconciliation was performed.  Discussed risks and benefits of medications.  Advised patient on orders and health maintenance.  We discussed old records and old labs if available.  Will request any records not available through epic.  Continue current medications listed on your summary sheet.

## 2025-05-21 NOTE — ASSESSMENT & PLAN NOTE
Continue protonix. Please be advised of condition course.  - Take PPI in the morning 30-60 minutes before breakfast  - I recommend ongoing Education for lifestyle modifications to help control/reduce reflux/abdominal pain including: avoid large meals, avoid eating within 2-3 hours of bedtime (avoid late night eating & lying down soon after eating), elevate head of bed if nocturnal symptoms are present, smoking cessation (if current smoker), & weight loss (if overweight).   - please be advised to avoid known foods which trigger reflux symptoms & to minimize/avoid high-fat foods, chocolate, caffeine, citrus, alcohol, & tomato products.  - Advised to avoid/limit use of NSAID's, since they can cause GI upset, bleeding, and/or ulcers. If needed, take with food.

## 2025-05-21 NOTE — TELEPHONE ENCOUNTER
Provider Staff:  Action required for this patient    Requires appointment      Please see care gap opportunities below in Care Due Message.    Thanks!  [facility]     Appointments      Date Provider   Last Visit   4/26/2024 Jared Vo MD   Next Visit   Visit date not found Jared Vo MD     Refill Decision Note   Skinny Del Cid  is requesting a refill authorization.  Brief Assessment and Rationale for Refill:  Approve     Medication Therapy Plan:        Comments:     Note composed:10:40 AM 05/21/2025

## 2025-05-21 NOTE — TELEPHONE ENCOUNTER
Care Due:                  Date            Visit Type   Department     Provider  --------------------------------------------------------------------------------                                EP -                              PRIMARY      [facility] FAMILY  Last Visit: 04-      CARE ([facility])   MEDICINE       Jared Vo  Next Visit: None Scheduled  None         None Found                                                            Last  Test          Frequency    Reason                     Performed    Due Date  --------------------------------------------------------------------------------    Office Visit  15 months..  amLODIPine,                04- 07-                             losartan-hydrochlorothiaz                             michelle, pantoprazole,                             venlafaxine..............    [employer] Embedded Care Due Messages. Reference number: [id number].   5/21/2025 10:23:03 AM CDT

## 2025-05-21 NOTE — ASSESSMENT & PLAN NOTE
"Per pulmonology, "Stable pulmonary nodules for at least 1.5 years, but likely back to 2020. Nonsmoker, likely benign. RTC 2 years with f/u CT. If stable, then no further CT's". Left axillary abnormality (stable on ultrasound).  "

## 2025-05-21 NOTE — Clinical Note
Please also let patient know that he had some pulmonary nodules on a previous CT chest. He was to repeat CT and follow up with pulmonology. A CT was ordered by pulm, can you schedule this? Please let him know to follow back up with pulmonology.

## 2025-05-21 NOTE — ASSESSMENT & PLAN NOTE
He has some vacations this Summer and gets motion sick. He is requesting Scopolamine patches which work well for him.

## 2025-05-22 ENCOUNTER — RESULTS FOLLOW-UP (OUTPATIENT)
Dept: FAMILY MEDICINE | Facility: CLINIC | Age: 70
End: 2025-05-22

## 2025-05-22 NOTE — PROGRESS NOTES
Make follow-up lab appointment per recommendation below.  Check to see if patient has seen the results through my chart.  If not then,  #CALL THE PATIENT# to discuss results/see if they have questions and document verification of contact. Make F/U appt if needed. [phone]      #My interpretation that was sent to them through [employer]:  Skinny, I have reviewed your recent blood work.     Your hemoglobin is normal.  Your metabolic panel which shows your glucose, kidney function, electrolytes, and liver function is normal.   Thyroid study is normal.   Your cholesterol is stable.    PSA screening for prostate cancer is within normal limits.  Repeat annually.  =========================  Also please address any outstanding health maintenance that may be due:   Health Maintenance Due   Topic Date Due    Hepatitis C Screening  Never done    TETANUS VACCINE  Never done    High Dose Statin  Never done    RSV Vaccine (Age 60+ and Pregnant patients) (1 - Risk 60-74 years 1-dose series) Never done    COVID-19 Vaccine (7 - 2024-25 season) 09/01/2024